# Patient Record
Sex: MALE | Race: WHITE | HISPANIC OR LATINO | Employment: FULL TIME | URBAN - METROPOLITAN AREA
[De-identification: names, ages, dates, MRNs, and addresses within clinical notes are randomized per-mention and may not be internally consistent; named-entity substitution may affect disease eponyms.]

---

## 2017-05-18 ENCOUNTER — OFFICE VISIT (OUTPATIENT)
Dept: URGENT CARE | Facility: PHYSICIAN GROUP | Age: 42
End: 2017-05-18
Payer: COMMERCIAL

## 2017-05-18 VITALS
TEMPERATURE: 99.7 F | DIASTOLIC BLOOD PRESSURE: 78 MMHG | WEIGHT: 221 LBS | HEART RATE: 92 BPM | OXYGEN SATURATION: 96 % | RESPIRATION RATE: 16 BRPM | HEIGHT: 75 IN | SYSTOLIC BLOOD PRESSURE: 122 MMHG | BODY MASS INDEX: 27.48 KG/M2

## 2017-05-18 DIAGNOSIS — J40 BRONCHITIS: ICD-10-CM

## 2017-05-18 PROCEDURE — 99214 OFFICE O/P EST MOD 30 MIN: CPT | Performed by: PHYSICIAN ASSISTANT

## 2017-05-18 RX ORDER — AZITHROMYCIN 250 MG/1
TABLET, FILM COATED ORAL
Qty: 6 TAB | Refills: 0 | Status: SHIPPED | OUTPATIENT
Start: 2017-05-18 | End: 2017-11-22

## 2017-05-18 RX ORDER — CODEINE PHOSPHATE AND GUAIFENESIN 10; 100 MG/5ML; MG/5ML
5 SOLUTION ORAL EVERY 4 HOURS PRN
Qty: 100 ML | Refills: 0 | Status: SHIPPED | OUTPATIENT
Start: 2017-05-18 | End: 2017-11-22

## 2017-05-18 ASSESSMENT — ENCOUNTER SYMPTOMS
SORE THROAT: 1
WHEEZING: 0
CHILLS: 1
COUGH: 1
PALPITATIONS: 0
HEMOPTYSIS: 0
SPUTUM PRODUCTION: 1
FEVER: 1
SHORTNESS OF BREATH: 1

## 2017-05-18 NOTE — PATIENT INSTRUCTIONS

## 2017-05-18 NOTE — MR AVS SNAPSHOT
"Gianluca Jasso   2017 2:25 PM   Office Visit   MRN: [id number]    Department:  [facility]   Dept Phone:  [phone]    Description:  Male : 1975   Provider:  Jai Zazueta PA-C           Reason for Visit     Cough Chest congestion, shortness of breath, nasal drainage X 6 days       Allergies as of 2017     No Known Allergies      You were diagnosed with     Bronchitis   [[id number]]         Vital Signs     Blood Pressure Pulse Temperature Respirations Height Weight    122/78 mmHg 92 37.6 °C (99.7 °F) 16 1.905 m (6' 3\") 100.245 kg (221 lb)    Body Mass Index Oxygen Saturation Smoking Status             27.62 kg/m2 96% Never Assessed         Basic Information     Date Of Birth Sex Race Ethnicity Preferred Language    1975 Male White  Origin (Danish,Georgian,[place] Islander,[place], etc) English      Health Maintenance        Date Due Completion Dates    IMM DTaP/Tdap/Td Vaccine (1 - Tdap) 1993 ---            Current Immunizations     No immunizations on file.      Below and/or attached are the medications your provider expects you to take. Review all of your home medications and newly ordered medications with your provider and/or pharmacist. Follow medication instructions as directed by your provider and/or pharmacist. Please keep your medication list with you and share with your provider. Update the information when medications are discontinued, doses are changed, or new medications (including over-the-counter products) are added; and carry medication information at all times in the event of emergency situations     Allergies:  No Known Allergies          Medications  Valid as of: May 18, 2017 -  3:58 PM    Generic Name Brand Name Tablet Size Instructions for use    Acetaminophen (Tab) TYLENOL 500 MG Take 500-1,000 mg by mouth every 6 hours as needed.        Amoxicillin (Cap) AMOXIL 500 MG Take 1 Cap by mouth 3 times a day.        Azithromycin (Tab) ZITHROMAX 250 MG " Take 500 mg (two tablets) on day one and then take 250 mg (1 tablet) for 4 days.        Guaifenesin-Codeine (Solution) ROBITUSSIN -10 mg/5mL Take 5 mL by mouth every four hours as needed for Cough.        .                 Medicines prescribed today were sent to:     [id number] - [place] - [address] [place] [address]    Phone: [phone] Fax: [phone]    Open 24 Hours?: No      Medication refill instructions:       If your prescription bottle indicates you have medication refills left, it is not necessary to call your provider’s office. Please contact your pharmacy and they will refill your medication.    If your prescription bottle indicates you do not have any refills left, you may request refills at any time through one of the following ways: The online [employer] system (except Urgent Care), by calling your provider’s office, or by asking your pharmacy to contact your provider’s office with a refill request. Medication refills are processed only during regular business hours and may not be available until the next business day. Your provider may request additional information or to have a follow-up visit with you prior to refilling your medication.   *Please Note: Medication refills are assigned a new Rx number when refilled electronically. Your pharmacy may indicate that no refills were authorized even though a new prescription for the same medication is available at the pharmacy. Please request the medicine by name with the pharmacy before contacting your provider for a refill.        Instructions    Bronchitis  Bronchitis is the body's way of reacting to injury and/or infection (inflammation) of the bronchi. Bronchi are the air tubes that extend from the windpipe into the lungs. If the inflammation becomes severe, it may cause shortness of breath.  CAUSES   Inflammation may be caused by:  · A virus.  · Germs (bacteria).  · Dust.  · Allergens.  · Pollutants and many other  irritants.  The cells lining the bronchial tree are covered with tiny hairs (cilia). These constantly beat upward, away from the lungs, toward the mouth. This keeps the lungs free of pollutants. When these cells become too irritated and are unable to do their job, mucus begins to develop. This causes the characteristic cough of bronchitis. The cough clears the lungs when the cilia are unable to do their job. Without either of these protective mechanisms, the mucus would settle in the lungs. Then you would develop pneumonia.  Smoking is a common cause of bronchitis and can contribute to pneumonia. Stopping this habit is the single most important thing you can do to help yourself.  TREATMENT   · Your caregiver may prescribe an antibiotic if the cough is caused by bacteria. Also, medicines that open up your airways make it easier to breathe. Your caregiver may also recommend or prescribe an expectorant. It will loosen the mucus to be coughed up. Only take over-the-counter or prescription medicines for pain, discomfort, or fever as directed by your caregiver.  · Removing whatever causes the problem (smoking, for example) is critical to preventing the problem from getting worse.  · Cough suppressants may be prescribed for relief of cough symptoms.  · Inhaled medicines may be prescribed to help with symptoms now and to help prevent problems from returning.  · For those with recurrent (chronic) bronchitis, there may be a need for steroid medicines.  SEEK IMMEDIATE MEDICAL CARE IF:   · During treatment, you develop more pus-like mucus (purulent sputum).  · You have a fever.  · Your baby is older than 3 months with a rectal temperature of 102° F (38.9° C) or higher.  · Your baby is 3 months old or younger with a rectal temperature of 100.4° F (38° C) or higher.  · You become progressively more ill.  · You have increased difficulty breathing, wheezing, or shortness of breath.  It is necessary to seek immediate medical care if  you are elderly or sick from any other disease.  MAKE SURE YOU:   · Understand these instructions.  · Will watch your condition.  · Will get help right away if you are not doing well or get worse.  Document Released: 12/18/2006 Document Revised: 03/11/2013 Document Reviewed: 10/27/2009  ExitCare® Patient Information ©2014 [employer].            "[employer]" Access Code: [email or web address]  Expires: 6/17/2017  2:46 PM    "[employer]"  A secure, online tool to manage your health information     [employer]’s "[employer]"® is a secure, online tool that connects you to your personalized health information from the privacy of your home -- day or night - making it very easy for you to manage your healthcare. Once the activation process is completed, you can even access your medical information using the "[employer]" rose, which is available for free in the Apple Rose store or Google Play store.     "[employer]" provides the following levels of access (as shown below):   My Chart Features   [facility] Doctor [facility]  Specialists [facility]  Urgent  Care Non-[facility]  Primary Care  Doctor   Email your healthcare team securely and privately 24/7 X X X    Manage appointments: schedule your next appointment; view details of past/upcoming appointments X      Request prescription refills. X      View recent personal medical records, including lab and immunizations X X X X   View health record, including health history, allergies, medications X X X X   Read reports about your outpatient visits, procedures, consult and ER notes X X X X   See your discharge summary, which is a recap of your hospital and/or ER visit that includes your diagnosis, lab results, and care plan. X X       How to register for "[employer]":  1. Go to  [email or web address].  2. Click on the Sign Up Now box, which takes you to the New Member Sign Up page. You will need to provide the following information:  a. Enter your "[employer]" Access Code exactly as it appears at the top of  this page. (You will not need to use this code after you’ve completed the sign-up process. If you do not sign up before the expiration date, you must request a new code.)   b. Enter your date of birth.   c. Enter your home email address.   d. Click Submit, and follow the next screen’s instructions.  3. Create a [employer] ID. This will be your [employer] login ID and cannot be changed, so think of one that is secure and easy to remember.  4. Create a [employer] password. You can change your password at any time.  5. Enter your Password Reset Question and Answer. This can be used at a later time if you forget your password.   6. Enter your e-mail address. This allows you to receive e-mail notifications when new information is available in [employer].  7. Click Sign Up. You can now view your health information.    For assistance activating your [employer] account, call [phone]

## 2017-05-19 NOTE — PROGRESS NOTES
"Subjective:      Gianluca Jasso is a 41 y.o. male who presents with Cough            Cough  This is a new problem. The current episode started in the past 7 days. The problem has been rapidly worsening. The cough is productive of sputum. Associated symptoms include chills, ear pain, a fever, a sore throat and shortness of breath. Pertinent negatives include no chest pain, hemoptysis or wheezing. He has tried OTC cough suppressant for the symptoms. The treatment provided no relief.       Review of Systems   Constitutional: Positive for fever, chills and malaise/fatigue.   HENT: Positive for congestion, ear pain and sore throat.    Respiratory: Positive for cough, sputum production and shortness of breath. Negative for hemoptysis and wheezing.    Cardiovascular: Negative for chest pain and palpitations.     All other systems reviewed and are negative.  PMH:  has no past medical history on file.  MEDS:   Current outpatient prescriptions:   •  guaifenesin-codeine (CHERATUSSIN AC) Solution oral solution, Take 5 mL by mouth every four hours as needed for Cough., Disp: 100 mL, Rfl: 0  •  azithromycin (ZITHROMAX) 250 MG Tab, Take 500 mg (two tablets) on day one and then take 250 mg (1 tablet) for 4 days., Disp: 6 Tab, Rfl: 0  •  acetaminophen (TYLENOL) 500 MG Tab, Take 500-1,000 mg by mouth every 6 hours as needed., Disp: , Rfl:   •  amoxicillin (AMOXIL) 500 MG Cap, Take 1 Cap by mouth 3 times a day., Disp: 30 Cap, Rfl: 0  ALLERGIES: No Known Allergies  SURGHX: History reviewed. No pertinent past surgical history.  SOCHX:    FH: Family history was reviewed, no pertinent findings to report  Medications, Allergies, and current problem list reviewed today in Epic       Objective:     /78 mmHg  Pulse 92  Temp(Src) 37.6 °C (99.7 °F)  Resp 16  Ht 1.905 m (6' 3\")  Wt 100.245 kg (221 lb)  BMI 27.62 kg/m2  SpO2 96%     Physical Exam   Constitutional: He is oriented to person, place, and time. He appears well-developed " and well-nourished.   HENT:   Head: Normocephalic and atraumatic.   Right Ear: Hearing, tympanic membrane, external ear and ear canal normal.   Left Ear: Hearing, tympanic membrane, external ear and ear canal normal.   Nose: Nose normal.   Mouth/Throat: Uvula is midline, oropharynx is clear and moist and mucous membranes are normal.   Neck: Normal range of motion. Neck supple.   Cardiovascular: Normal rate, regular rhythm and normal heart sounds.  Exam reveals no gallop and no friction rub.    No murmur heard.  Pulmonary/Chest: Effort normal and breath sounds normal. No accessory muscle usage. No apnea, no tachypnea and no bradypnea. No respiratory distress. He has no decreased breath sounds. He has no wheezes. He has no rhonchi. He has no rales. He exhibits no tenderness.   Neurological: He is alert and oriented to person, place, and time.   Skin: Skin is warm and dry.   Psychiatric: He has a normal mood and affect. His behavior is normal. Judgment and thought content normal.   Vitals reviewed.              Assessment/Plan:     1. Bronchitis    - guaifenesin-codeine (CHERATUSSIN AC) Solution oral solution; Take 5 mL by mouth every four hours as needed for Cough.  Dispense: 100 mL; Refill: 0  - azithromycin (ZITHROMAX) 250 MG Tab; Take 500 mg (two tablets) on day one and then take 250 mg (1 tablet) for 4 days.  Dispense: 6 Tab; Refill: 0    Differential diagnosis, natural history, supportive care, and indications for immediate follow-up discussed at length.   Follow-up with primary care provider within 4-5 days, emergency room precautions discussed.  Patient and/or family appears understanding of information.

## 2017-11-22 ENCOUNTER — OFFICE VISIT (OUTPATIENT)
Dept: URGENT CARE | Facility: PHYSICIAN GROUP | Age: 42
End: 2017-11-22
Payer: COMMERCIAL

## 2017-11-22 ENCOUNTER — APPOINTMENT (OUTPATIENT)
Dept: RADIOLOGY | Facility: IMAGING CENTER | Age: 42
End: 2017-11-22
Attending: PHYSICIAN ASSISTANT
Payer: COMMERCIAL

## 2017-11-22 VITALS
SYSTOLIC BLOOD PRESSURE: 130 MMHG | BODY MASS INDEX: 26.73 KG/M2 | WEIGHT: 215 LBS | TEMPERATURE: 98.9 F | DIASTOLIC BLOOD PRESSURE: 78 MMHG | OXYGEN SATURATION: 95 % | HEART RATE: 88 BPM | HEIGHT: 75 IN | RESPIRATION RATE: 16 BRPM

## 2017-11-22 DIAGNOSIS — M54.32 LEFT SIDED SCIATICA: ICD-10-CM

## 2017-11-22 PROCEDURE — 99214 OFFICE O/P EST MOD 30 MIN: CPT | Performed by: PHYSICIAN ASSISTANT

## 2017-11-22 PROCEDURE — 72100 X-RAY EXAM L-S SPINE 2/3 VWS: CPT | Mod: TC | Performed by: PHYSICIAN ASSISTANT

## 2017-11-22 RX ORDER — KETOROLAC TROMETHAMINE 30 MG/ML
60 INJECTION, SOLUTION INTRAMUSCULAR; INTRAVENOUS ONCE
Status: COMPLETED | OUTPATIENT
Start: 2017-11-22 | End: 2017-11-22

## 2017-11-22 RX ORDER — MELOXICAM 15 MG/1
15 TABLET ORAL DAILY
Qty: 10 TAB | Refills: 0 | Status: SHIPPED | OUTPATIENT
Start: 2017-11-22 | End: 2017-12-02

## 2017-11-22 RX ADMIN — KETOROLAC TROMETHAMINE 60 MG: 30 INJECTION, SOLUTION INTRAMUSCULAR; INTRAVENOUS at 18:34

## 2017-11-22 ASSESSMENT — ENCOUNTER SYMPTOMS
LEG PAIN: 1
CONSTITUTIONAL NEGATIVE: 1
NEUROLOGICAL NEGATIVE: 1
BRUISES/BLEEDS EASILY: 0

## 2017-11-23 NOTE — PROGRESS NOTES
"Subjective:      Gianluca Jasso is a 42 y.o. male who presents with Leg Pain (left hip and leg pain - shooting pain from buttock down to bottom of foot  X 2 days )        Leg Pain     Patient presents today for 2 days of left sided buttock pain radiating down to the bottom of his foot. He states he feels the pain most intensely in the buttock and thigh but sometimes it will radiate down to the heel.  He states there is occasional tingling but no numbness.   He denies weakness in the leg.  Having hard time sleeping or turning in bed but worst pain is with standing up and taking first few steps.  He denies any injuries or traumas.  He does not have chronic back issues.  Had something similar to a more mild degree happen in the past but never sought treatment as it went away on its own.  States he took a few OTC medications but not helping.     Review of Systems   Constitutional: Negative.    Musculoskeletal:        SEE HPI   Skin: Negative.    Neurological: Negative.    Endo/Heme/Allergies: Does not bruise/bleed easily.   All other systems reviewed and are negative.       PMH:  has no past medical history on file.  MEDS:   Current Outpatient Prescriptions:   •  meloxicam (MOBIC) 15 MG tablet, Take 1 Tab by mouth every day for 10 days., Disp: 10 Tab, Rfl: 0  •  acetaminophen (TYLENOL) 500 MG Tab, Take 500-1,000 mg by mouth every 6 hours as needed., Disp: , Rfl:   ALLERGIES: No Known Allergies  SURGHX: History reviewed. No pertinent surgical history.  SOCHX:  reports that he has been smoking.  He has never used smokeless tobacco.  FH: Family history was reviewed, no pertinent findings to report     Objective:     /78   Pulse 88   Temp 37.2 °C (98.9 °F)   Resp 16   Ht 1.905 m (6' 3\")   Wt 97.5 kg (215 lb)   SpO2 95%   BMI 26.87 kg/m²      Physical Exam   Constitutional: He is oriented to person, place, and time. He appears well-developed and well-nourished. No distress.   Cardiovascular: Normal rate and " regular rhythm.    Pulmonary/Chest: Effort normal and breath sounds normal.   Musculoskeletal:        Legs:  Neurological: He is alert and oriented to person, place, and time.   Skin: Skin is warm and dry.   Psychiatric: He has a normal mood and affect. His behavior is normal.   Vitals reviewed.          Assessment/Plan:     1. Left sided sciatica  DX-LUMBAR SPINE-2 OR 3 VIEWS    ketorolac (TORADOL) injection 60 mg    meloxicam (MOBIC) 15 MG tablet         -RAD without acute abnormalities.   -Toradol shot given in clinic, patient tolerated well.  Monitored for 10 mins s/p shot.   -dx of sciatica, education provided verbally and in writing.   -back care discussed, proper lifting mechanics discussed  -recommend heat/ice prn.  Gentle stretches daily.   -recommend PCP follow up, anti-inflammatory trial as above. Start tomorrow.  Stay well hydrated.   -back pain red flags and ER precautions discussed with patient.       Supportive care, differential diagnoses, and indications for immediate follow-up discussed with patient.   Pathogenesis of diagnosis discussed including typical length and natural progression.   Instructed to return to clinic or nearest emergency department for any change in condition, further concerns, or worsening of symptoms.  Patient states understanding of the plan of care and discharge instructions.  Instructed to make an appointment, for follow up, with their primary care provider.      Mitzi Pablo P.A.-C.

## 2017-11-30 ENCOUNTER — HOSPITAL ENCOUNTER (OUTPATIENT)
Dept: LAB | Facility: MEDICAL CENTER | Age: 42
End: 2017-11-30
Attending: NURSE PRACTITIONER
Payer: COMMERCIAL

## 2017-11-30 ENCOUNTER — OFFICE VISIT (OUTPATIENT)
Dept: MEDICAL GROUP | Facility: PHYSICIAN GROUP | Age: 42
End: 2017-11-30
Payer: COMMERCIAL

## 2017-11-30 ENCOUNTER — TELEPHONE (OUTPATIENT)
Dept: MEDICAL GROUP | Facility: PHYSICIAN GROUP | Age: 42
End: 2017-11-30

## 2017-11-30 VITALS
DIASTOLIC BLOOD PRESSURE: 80 MMHG | TEMPERATURE: 97.6 F | RESPIRATION RATE: 16 BRPM | OXYGEN SATURATION: 97 % | BODY MASS INDEX: 26.49 KG/M2 | SYSTOLIC BLOOD PRESSURE: 116 MMHG | HEART RATE: 65 BPM | HEIGHT: 75 IN | WEIGHT: 213 LBS

## 2017-11-30 DIAGNOSIS — J39.2 LESION OF THROAT: ICD-10-CM

## 2017-11-30 DIAGNOSIS — Z00.00 WELLNESS EXAMINATION: ICD-10-CM

## 2017-11-30 DIAGNOSIS — M54.32 LEFT SIDED SCIATICA: ICD-10-CM

## 2017-11-30 LAB
ALBUMIN SERPL BCP-MCNC: 4.2 G/DL (ref 3.2–4.9)
ALBUMIN/GLOB SERPL: 1.5 G/DL
ALP SERPL-CCNC: 87 U/L (ref 30–99)
ALT SERPL-CCNC: 25 U/L (ref 2–50)
ANION GAP SERPL CALC-SCNC: 10 MMOL/L (ref 0–11.9)
AST SERPL-CCNC: 14 U/L (ref 12–45)
BASOPHILS # BLD AUTO: 0.7 % (ref 0–1.8)
BASOPHILS # BLD: 0.07 K/UL (ref 0–0.12)
BILIRUB SERPL-MCNC: 0.5 MG/DL (ref 0.1–1.5)
BUN SERPL-MCNC: 18 MG/DL (ref 8–22)
CALCIUM SERPL-MCNC: 9.9 MG/DL (ref 8.5–10.5)
CHLORIDE SERPL-SCNC: 103 MMOL/L (ref 96–112)
CHOLEST SERPL-MCNC: 221 MG/DL (ref 100–199)
CO2 SERPL-SCNC: 25 MMOL/L (ref 20–33)
CREAT SERPL-MCNC: 0.8 MG/DL (ref 0.5–1.4)
EOSINOPHIL # BLD AUTO: 0.25 K/UL (ref 0–0.51)
EOSINOPHIL NFR BLD: 2.5 % (ref 0–6.9)
ERYTHROCYTE [DISTWIDTH] IN BLOOD BY AUTOMATED COUNT: 40.9 FL (ref 35.9–50)
GFR SERPL CREATININE-BSD FRML MDRD: >60 ML/MIN/1.73 M 2
GLOBULIN SER CALC-MCNC: 2.8 G/DL (ref 1.9–3.5)
GLUCOSE SERPL-MCNC: 229 MG/DL (ref 65–99)
HCT VFR BLD AUTO: 46.1 % (ref 42–52)
HDLC SERPL-MCNC: 38 MG/DL
HGB BLD-MCNC: 15.2 G/DL (ref 14–18)
IMM GRANULOCYTES # BLD AUTO: 0.06 K/UL (ref 0–0.11)
IMM GRANULOCYTES NFR BLD AUTO: 0.6 % (ref 0–0.9)
LDLC SERPL CALC-MCNC: 115 MG/DL
LYMPHOCYTES # BLD AUTO: 3.96 K/UL (ref 1–4.8)
LYMPHOCYTES NFR BLD: 39.1 % (ref 22–41)
MCH RBC QN AUTO: 29.6 PG (ref 27–33)
MCHC RBC AUTO-ENTMCNC: 33 G/DL (ref 33.7–35.3)
MCV RBC AUTO: 89.7 FL (ref 81.4–97.8)
MONOCYTES # BLD AUTO: 0.69 K/UL (ref 0–0.85)
MONOCYTES NFR BLD AUTO: 6.8 % (ref 0–13.4)
NEUTROPHILS # BLD AUTO: 5.09 K/UL (ref 1.82–7.42)
NEUTROPHILS NFR BLD: 50.3 % (ref 44–72)
NRBC # BLD AUTO: 0 K/UL
NRBC BLD AUTO-RTO: 0 /100 WBC
PLATELET # BLD AUTO: 326 K/UL (ref 164–446)
PMV BLD AUTO: 11.7 FL (ref 9–12.9)
POTASSIUM SERPL-SCNC: 4.7 MMOL/L (ref 3.6–5.5)
PROT SERPL-MCNC: 7 G/DL (ref 6–8.2)
RBC # BLD AUTO: 5.14 M/UL (ref 4.7–6.1)
SODIUM SERPL-SCNC: 138 MMOL/L (ref 135–145)
TRIGL SERPL-MCNC: 339 MG/DL (ref 0–149)
WBC # BLD AUTO: 10.1 K/UL (ref 4.8–10.8)

## 2017-11-30 PROCEDURE — 80053 COMPREHEN METABOLIC PANEL: CPT

## 2017-11-30 PROCEDURE — 36415 COLL VENOUS BLD VENIPUNCTURE: CPT

## 2017-11-30 PROCEDURE — 85025 COMPLETE CBC W/AUTO DIFF WBC: CPT

## 2017-11-30 PROCEDURE — 80061 LIPID PANEL: CPT

## 2017-11-30 PROCEDURE — 99214 OFFICE O/P EST MOD 30 MIN: CPT | Performed by: NURSE PRACTITIONER

## 2017-11-30 RX ORDER — TIZANIDINE HYDROCHLORIDE 4 MG/1
4 CAPSULE, GELATIN COATED ORAL 3 TIMES DAILY
Qty: 90 CAP | Refills: 0 | Status: SHIPPED | OUTPATIENT
Start: 2017-11-30 | End: 2018-01-22

## 2017-11-30 ASSESSMENT — PATIENT HEALTH QUESTIONNAIRE - PHQ9: CLINICAL INTERPRETATION OF PHQ2 SCORE: 0

## 2017-11-30 ASSESSMENT — PAIN SCALES - GENERAL: PAINLEVEL: NO PAIN

## 2017-11-30 NOTE — ASSESSMENT & PLAN NOTE
"This is a new problem. Patient states that his dentist noticed \"something in his throat\" and told him to have it checked out by a primary care provider. Patient states that he does not have any pain, sore throat, itching or irritation. Patient is a smoker but has been working on quitting. Patient states he has not had any discharge, bumps or irritation.  "

## 2017-11-30 NOTE — PROGRESS NOTES
"Chief Complaint   Patient presents with   • Establish Care     New Patient    • Back Pain     L side x 1 week goes down leg    • Pharyngitis     x 3 weeks        HISTORY OF THE PRESENT ILLNESS: This is a 42 y.o. male new patient to our practice. This pleasant patient is here today to discuss sciatica.    Left sided sciatica  This is a new problem started last Monday, went to the gym. States when he woke up and got out of bed he felt a sudden \"pull like a spring and a tingling sensation\" by Wednesday it was so severe he was having difficulty walking. States he is having some numbness in his left leg. Meloxicam improves the pain, but does affect the numbess. Stretching and exercising improves the numbness states that it is resolving. No issues with bowel or bladder function. 3/10 at the worst currently.     Lesion of throat  This is a new problem. Patient states that his dentist noticed \"something in his throat\" and told him to have it checked out by a primary care provider. Patient states that he does not have any pain, sore throat, itching or irritation. Patient is a smoker but has been working on quitting. Patient states he has not had any discharge, bumps or irritation.      History reviewed. No pertinent past medical history.    History reviewed. No pertinent surgical history.    Family Status   Relation Status   • Mother Alive   • Father Alive     Family History   Problem Relation Age of Onset   • Kidney Disease Mother    • Diabetes Mother    • Hypertension Mother    • Diabetes Father        Social History   Substance Use Topics   • Smoking status: Light Tobacco Smoker     Types: Cigarettes   • Smokeless tobacco: Never Used      Comment: Patient is smoking 1-2x/week   • Alcohol use Yes       Allergies: Patient has no known allergies.    Current Outpatient Prescriptions Ordered in [facility]   Medication Sig Dispense Refill   • tizanidine (ZANAFLEX) 4 MG capsule Take 1 Cap by mouth 3 times a day. 90 Cap 0   • meloxicam " "(MOBIC) 15 MG tablet Take 1 Tab by mouth every day for 10 days. 10 Tab 0     No current Epic-ordered facility-administered medications on file.        Review of Systems   Constitutional:  Negative for fever, chills, weight loss and malaise/fatigue.   HENT:  Negative for ear pain, nosebleeds, congestion, sore throat and neck pain.    Eyes:  Negative for blurred vision.   Respiratory:  Negative for cough, sputum production, shortness of breath and wheezing.    Cardiovascular:  Negative for chest pain, palpitations, orthopnea and leg swelling.   Gastrointestinal:  Negative for heartburn, nausea, vomiting and abdominal pain.   Genitourinary:  Negative for dysuria, urgency and frequency.   Musculoskeletal: Positive for myalgias, back pain and joint pain.   Skin: Negative for rash and itching.   Neurological:  Negative for dizziness, tingling, tremors, sensory change, focal weakness and headaches.   Endo/Heme/Allergies:  Does not bruise/bleed easily.   Psychiatric/Behavioral:  Negative for depression, anxiety, or memory loss.     All other systems reviewed and are negative except as in HPI.    Exam: Blood pressure 116/80, pulse 65, temperature 36.4 °C (97.6 °F), resp. rate 16, height 1.905 m (6' 3\"), weight 96.6 kg (213 lb), SpO2 97 %.  General:  Normal appearing. No distress.  HEENT:  Normocephalic. Eyes conjunctiva clear lids without ptosis, pupils equal and reactive to light accommodation, ears normal shape and contour, canals are clear bilaterally, tympanic membranes are benign, nasal mucosa benign, oropharynx is with round protruding pink flap of skin on the right superior oral cavity, there is also a white patch on the left side that does not clear with gentle wiping.   Neck:  Supple without JVD or bruit. Thyroid is not enlarged.  Pulmonary:  Clear to ausculation.  Normal effort. No rales, ronchi, or wheezing.  Cardiovascular:  Regular rate and rhythm without murmur. Carotid and radial pulses are intact and equal " bilaterally.  Abdomen:  Soft, nontender, nondistended. Normal bowel sounds. Liver and spleen are not palpable  Neurologic:  Grossly nonfocal  Lymph:  No cervical, supraclavicular or axillary lymph nodes are palpable  Skin:  Warm and dry.  No obvious lesions.  Musculoskeletal:  Normal gait. No extremity cyanosis, clubbing, or edema. SPINE: Moderate tenderness in paraspinous muscles lumbar spine with current spasm. SLT negative. DTR 2+ patella, 1+ achilles bilaterally. Strength 5/5 proximal and distal LE.  No pain with stress of SI. Full hip ROM. Poor hamstring flexibility. No symptoms with axial loading.   Psych:  Normal mood and affect. Alert and oriented x3. Judgment and insight is normal.    PLAN:    1. Left sided sciatica  Counseled patient regarding continuation of conservative therapy including exercise, meloxicam, ice, rest.   He is referred to physical therapy for assistance with exercises to improve sciatica.  Patient states that numbness is improving, as such we will hold off on imaging at this time.  - REFERRAL TO PHYSICAL THERAPY Reason for Therapy: Eval/Treat/Report  - tizanidine (ZANAFLEX) 4 MG capsule; Take 1 Cap by mouth 3 times a day.  Dispense: 90 Cap; Refill: 0    2. Wellness examination  - COMP METABOLIC PANEL; Future  - LIPID PROFILE; Future  - CBC WITH DIFFERENTIAL; Future    3. Lesion of throat  - REFERRAL TO ENT    Follow-up as needed, a son lab results or in one year for physical examination. Patient is encouraged to be seen in the emergency room for chest pain, palpitations, shortness of breath, dizziness, severe abdominal pain or other concerning symptoms.    Please note that this dictation was created using voice recognition software. I have made every reasonable attempt to correct obvious errors, but I expect that there are errors of grammar and possibly content that I did not discover before finalizing the note.      Assessment/Plan  1. Left sided sciatica  REFERRAL TO PHYSICAL THERAPY  Reason for Therapy: Eval/Treat/Report    tizanidine (ZANAFLEX) 4 MG capsule   2. Wellness examination  COMP METABOLIC PANEL    LIPID PROFILE    CBC WITH DIFFERENTIAL   3. Lesion of throat  REFERRAL TO ENT         I have placed the below orders and discussed them with an approved delegating provider. The MA is performing the below orders under the direction of Dr. Richardson.

## 2017-11-30 NOTE — ASSESSMENT & PLAN NOTE
"This is a new problem started last Monday, went to the gym. States when he woke up and got out of bed he felt a sudden \"pull like a spring and a tingling sensation\" by Wednesday it was so severe he was having difficulty walking. States he is having some numbness in his left leg. Meloxicam improves the pain, but does affect the numbess. Stretching and exercising improves the numbness states that it is resolving. No issues with bowel or bladder function. 3/10 at the worst currently.   "

## 2017-12-01 NOTE — TELEPHONE ENCOUNTER
----- Message from CLAUDE Enriquez sent at 11/30/2017  5:14 PM PST -----  Lab Review: Please have patient schedule close follow-up to discuss. With patient noted that his fasting glucose was significantly elevated at 229 and then I would like to see him to discuss this result.

## 2017-12-01 NOTE — TELEPHONE ENCOUNTER
Phone Number Called: [phone] (home)     Message: Pt notified of results below.     Left Message for patient to call back: no

## 2017-12-05 ENCOUNTER — OFFICE VISIT (OUTPATIENT)
Dept: MEDICAL GROUP | Facility: PHYSICIAN GROUP | Age: 42
End: 2017-12-05
Payer: COMMERCIAL

## 2017-12-05 VITALS
RESPIRATION RATE: 16 BRPM | DIASTOLIC BLOOD PRESSURE: 80 MMHG | TEMPERATURE: 97.5 F | OXYGEN SATURATION: 97 % | BODY MASS INDEX: 27.1 KG/M2 | SYSTOLIC BLOOD PRESSURE: 124 MMHG | WEIGHT: 218 LBS | HEIGHT: 75 IN | HEART RATE: 62 BPM

## 2017-12-05 DIAGNOSIS — R73.01 ELEVATED FASTING GLUCOSE: ICD-10-CM

## 2017-12-05 PROBLEM — E11.9 TYPE 2 DIABETES MELLITUS WITHOUT COMPLICATION, WITHOUT LONG-TERM CURRENT USE OF INSULIN (HCC): Status: ACTIVE | Noted: 2017-12-05

## 2017-12-05 LAB
HBA1C MFR BLD: 9.5 % (ref ?–5.8)
INT CON NEG: NEGATIVE
INT CON POS: POSITIVE

## 2017-12-05 PROCEDURE — 99214 OFFICE O/P EST MOD 30 MIN: CPT | Performed by: NURSE PRACTITIONER

## 2017-12-05 PROCEDURE — 83036 HEMOGLOBIN GLYCOSYLATED A1C: CPT | Performed by: NURSE PRACTITIONER

## 2017-12-05 ASSESSMENT — PAIN SCALES - GENERAL: PAINLEVEL: NO PAIN

## 2017-12-05 NOTE — ASSESSMENT & PLAN NOTE
"States he has family history. Cut carbs and sugar out of his diet 1 year ago. States he does some rice and beans. No soda. He does exercise. Denies symptoms including thirst, frequent urination. States he has started exercising. 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 Patient does have significant history of mother with diabetes. States that mother does not care for her diabetes appropriately. He states that he would like to care for his diabetes \"naturally\". Patient does not want to be on chronic medication. Patient is given metformin 500 mg twice daily at this time and encouraged up regarding the importance of controlling diabetes. Patient's A1c is 9.5 at this time. As with patient that this does put him in the category of diabetes.  "

## 2017-12-05 NOTE — PROGRESS NOTES
"Chief Complaint   Patient presents with   • Follow-Up     labs        HISTORY OF PRESENT ILLNESS: Patient is a 42 y.o. male established patient who presents today to discuss elevated blood sugar.    Type 2 diabetes mellitus without complication, without long-term current use of insulin (CMS-HCC)  States he has family history. Cut carbs and sugar out of his diet 1 year ago. States he does some rice and beans. No soda. He does exercise. Denies symptoms including thirst, frequent urination. States he has started exercising. 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 Patient does have significant history of mother with diabetes. States that mother does not care for her diabetes appropriately. He states that he would like to care for his diabetes \"naturally\". Patient does not want to be on chronic medication. Patient is given metformin 500 mg twice daily at this time and encouraged up regarding the importance of controlling diabetes. Patient's A1c is 9.5 at this time. As with patient that this does put him in the category of diabetes.      Patient Active Problem List    Diagnosis Date Noted   • Type 2 diabetes mellitus without complication, without long-term current use of insulin (CMS-HCC) 12/05/2017   • Left sided sciatica 11/30/2017   • Lesion of throat 11/30/2017       Allergies:Patient has no known allergies.    Current Outpatient Prescriptions   Medication Sig Dispense Refill   • metformin (GLUCOPHAGE) 500 MG Tab Take 1 Tab by mouth 2 times a day, with meals. 60 Tab 0   • Blood Glucose Monitoring Suppl Device Meter: Dispense Device of Insurance Preference. Sig. Use as directed for blood sugar monitoring. #1. NR. 1 Device 0   • Blood Glucose Monitoring Suppl Supplies Misc Test strips order: Test strips for covered by insurance meter. Sig: use once daily and prn ssx high or low sugar #100 RF x 3 100 Strip 3   • tizanidine (ZANAFLEX) 4 MG capsule Take 1 Cap by mouth 3 times a day. 90 Cap 0     No current " "facility-administered medications for this visit.        Social History   Substance Use Topics   • Smoking status: Light Tobacco Smoker     Types: Cigarettes   • Smokeless tobacco: Never Used      Comment: Patient is smoking 1-2x/week   • Alcohol use Yes       Family Status   Relation Status   • Mother Alive   • Father Alive     Family History   Problem Relation Age of Onset   • Kidney Disease Mother    • Diabetes Mother    • Hypertension Mother    • Diabetes Father        Review of Systems:   Constitutional:  Negative for fever, chills, weight loss and malaise/fatigue.   HEENT:  Negative for ear pain, nosebleeds, congestion, sore throat and neck pain.    Eyes:  Negative for blurred vision.   Respiratory:  Negative for cough, sputum production, shortness of breath and wheezing.    Cardiovascular:  Negative for chest pain, palpitations, orthopnea and leg swelling.   Gastrointestinal:  Negative for heartburn, nausea, vomiting and abdominal pain.   Genitourinary:  Negative for dysuria, urgency and frequency.   Musculoskeletal:  Negative for myalgias, back pain and joint pain.   Skin:  Negative for rash and itching.   Neurological:  Negative for dizziness, tingling, tremors, sensory change, focal weakness and headaches.   Endo/Heme/Allergies:  Does not bruise/bleed easily.   Psychiatric/Behavioral:  Negative for depression, suicidal ideas and memory loss.  The patient is not nervous/anxious and does not have insomnia.    All other systems reviewed and are negative except as in HPI.    Exam:  Blood pressure 124/80, pulse 62, temperature 36.4 °C (97.5 °F), resp. rate 16, height 1.905 m (6' 3\"), weight 98.9 kg (218 lb), SpO2 97 %.  General:  Normal appearing. No distress.  HEENT:  Normocephalic. Eyes conjunctiva clear lids without ptosis, pupils equal and reactive to light accommodation, ears normal shape and contour, canals are clear bilaterally, tympanic membranes are benign, nasal mucosa benign, oropharynx is without " erythema, edema or exudates.   Neck:  Supple without JVD or bruit. Thyroid is not enlarged.  Pulmonary:  Clear to ausculation.  Normal effort. No rales, ronchi, or wheezing.  Cardiovascular:  Regular rate and rhythm without murmur. Carotid and radial pulses are intact and equal bilaterally.  Abdomen:  Soft, nontender, nondistended. Normal bowel sounds. Liver and spleen are not palpable  Neurologic:  Grossly nonfocal  Lymph:  No cervical, supraclavicular or axillary lymph nodes are palpable  Skin:  Warm and dry.  No obvious lesions.  Musculoskeletal:  Normal gait. No extremity cyanosis, clubbing, or edema.  Psych:  Normal mood and affect. Alert and oriented x3. Judgment and insight is normal.      PLAN:    1. Uncontrolled type 2 diabetes mellitus without complication, without long-term current use of insulin (CMS-HCC)  POCT A1c is 9.5.  Counseled patient regarding diet and exercise, discussed carbs, discussed monitoring glucose at couple of times a week discussed importance of taking medication to control blood sugar to prevent secondary complications. Patient refuses to start statin, low-dose lisinopril at this time. Plan to follow up with patient in one month.  - metformin (GLUCOPHAGE) 500 MG Tab; Take 1 Tab by mouth 2 times a day, with meals.  Dispense: 60 Tab; Refill: 0  - Blood Glucose Monitoring Suppl Device; Meter: Dispense Device of Insurance Preference. Sig. Use as directed for blood sugar monitoring. #1. NR.  Dispense: 1 Device; Refill: 0  - Blood Glucose Monitoring Suppl Supplies Misc; Test strips order: Test strips for covered by insurance meter. Sig: use once daily and prn ssx high or low sugar #100 RF x 3  Dispense: 100 Strip; Refill: 3  - REFERRAL TO DIABETIC EDUCATION Diabetes Self Management Education / Training (DSME/T) and Medical Nutrition Therapy (MNT): Initial Group DSME/MNT as authorized by payor; DSME/T Content: Monitoring Diabetes, Diabetes as disease process, Psychologic...    Follow-up in  one month or sooner as needed. Patient is encouraged to be seen in the emergency room for chest pain, palpitations, shortness of breath, dizziness, severe abdominal pain or other concerning symptoms.    Please note that this dictation was created using voice recognition software. I have made every reasonable attempt to correct obvious errors, but I expect that there are errors of grammar and possibly content that I did not discover before finalizing the note.    Assessment/Plan:  1. Elevated fasting glucose  POCT  A1C   2. Uncontrolled type 2 diabetes mellitus without complication, without long-term current use of insulin (CMS-[facility])  metformin (GLUCOPHAGE) 500 MG Tab    Blood Glucose Monitoring Suppl Device    Blood Glucose Monitoring Suppl Supplies Misc    REFERRAL TO DIABETIC EDUCATION Diabetes Self Management Education / Training (DSME/T) and Medical Nutrition Therapy (MNT): Initial Group DSME/MNT as authorized by payor; DSME/T Content: Monitoring Diabetes, Diabetes as disease process, Psychologic...          I have placed the below orders and discussed them with an approved delegating provider. The MA is performing the below orders under the direction of Dr. Richardson.

## 2017-12-08 ENCOUNTER — APPOINTMENT (OUTPATIENT)
Dept: PHYSICAL THERAPY | Facility: REHABILITATION | Age: 42
End: 2017-12-08
Attending: NURSE PRACTITIONER
Payer: COMMERCIAL

## 2018-01-22 ENCOUNTER — HOSPITAL ENCOUNTER (OUTPATIENT)
Dept: LAB | Facility: MEDICAL CENTER | Age: 43
End: 2018-01-22
Attending: NURSE PRACTITIONER
Payer: COMMERCIAL

## 2018-01-22 ENCOUNTER — OFFICE VISIT (OUTPATIENT)
Dept: MEDICAL GROUP | Facility: PHYSICIAN GROUP | Age: 43
End: 2018-01-22
Payer: COMMERCIAL

## 2018-01-22 VITALS
DIASTOLIC BLOOD PRESSURE: 80 MMHG | BODY MASS INDEX: 26.95 KG/M2 | TEMPERATURE: 97.7 F | WEIGHT: 215.61 LBS | HEART RATE: 79 BPM | OXYGEN SATURATION: 97 % | SYSTOLIC BLOOD PRESSURE: 123 MMHG

## 2018-01-22 DIAGNOSIS — J02.0 STREP THROAT: ICD-10-CM

## 2018-01-22 DIAGNOSIS — E11.9 TYPE 2 DIABETES MELLITUS WITHOUT COMPLICATION, WITHOUT LONG-TERM CURRENT USE OF INSULIN (HCC): ICD-10-CM

## 2018-01-22 PROBLEM — J02.9 SORE THROAT: Status: ACTIVE | Noted: 2018-01-22

## 2018-01-22 LAB
CREAT UR-MCNC: 114.1 MG/DL
MICROALBUMIN UR-MCNC: <0.7 MG/DL
MICROALBUMIN/CREAT UR: NORMAL MG/G (ref 0–30)

## 2018-01-22 PROCEDURE — 99215 OFFICE O/P EST HI 40 MIN: CPT | Performed by: NURSE PRACTITIONER

## 2018-01-22 PROCEDURE — 82043 UR ALBUMIN QUANTITATIVE: CPT

## 2018-01-22 PROCEDURE — 82570 ASSAY OF URINE CREATININE: CPT

## 2018-01-22 RX ORDER — AMOXICILLIN AND CLAVULANATE POTASSIUM 875; 125 MG/1; MG/1
1 TABLET, FILM COATED ORAL 2 TIMES DAILY
Qty: 20 TAB | Refills: 0 | Status: SHIPPED | OUTPATIENT
Start: 2018-01-22 | End: 2018-02-01

## 2018-01-22 NOTE — PROGRESS NOTES
RN-ERICKE Note    Subjective:     Health changes since last visit/interval Hx: new diagnosis of diabetes December 2017, started metformin 500 mg twice daily.  He also stared natural pills, Moringa and Artichoke.  He believes these have lowered FSBG more than metformin    Medications (including changes made today)  Current Outpatient Prescriptions   Medication Sig Dispense Refill   • metformin (GLUCOPHAGE) 500 MG Tab TAKE 1 TAB BY MOUTH 2 TIMES A DAY, WITH MEALS. 180 Tab 0   • Blood Glucose Monitoring Suppl Device Meter: Dispense Device of Insurance Preference. Sig. Use as directed for blood sugar monitoring. #1. NR. 1 Device 0   • Blood Glucose Monitoring Suppl Supplies Misc Test strips order: Test strips for covered by insurance meter. Sig: use once daily and prn ssx high or low sugar #100 RF x 3 100 Strip 3     No current facility-administered medications for this visit.        Taking daily ASA: No  Taking above medications as prescribed: Yes  Patient Denies side effects of medication.    Exercise: sporadic irregular exercise, <half hour walking weekly  Diet: meals per day on average: 3 balanced except fruit shake at breakfast    Health Maintenance:   Health Maintenance Topics with due status: Overdue       Topic Date Due    IMM HEP B VACCINE 1975    DIABETES MONOFILAMENT / LE EXAM 03/20/1975    RETINAL SCREENING 09/20/1992    URINE ACR / MICROALBUMIN 09/20/1992         DM:   Last A1c:   Lab Results   Component Value Date/Time    HBA1C 9.5 12/05/2017 11:02 AM      A1c goal: < 7    Glucose monitoring frequency: daily  fasting range: 115-180    Hypoglycemic episodes: no     Last Retinal Exam: June 2017 Provider: Lizette  Daily Foot Exam: no  Routine Dental Exams: yes    No results found for: MICROALBCALC, MALBCRT, MALBEXCR, WGXOKE51, MICROALBUR, MICRALB, UMICROALBUM, MICROALBTIM     ACR Albumin/Creatinine Ratio goal <30 due    Diabetic complications: none    HTN:   Blood pressure goal <140/<80 yes.   Currently Rx  ACE/ARB: No    Dyslipidemia:    Lab Results   Component Value Date/Time    CHOLSTRLTOT 221 (H) 11/30/2017 07:58 AM     (H) 11/30/2017 07:58 AM    HDL 38 (A) 11/30/2017 07:58 AM    TRIGLYCERIDE 339 (H) 11/30/2017 07:58 AM       Lab Results   Component Value Date/Time    SODIUM 138 11/30/2017 07:58 AM    POTASSIUM 4.7 11/30/2017 07:58 AM    CHLORIDE 103 11/30/2017 07:58 AM    CO2 25 11/30/2017 07:58 AM    GLUCOSE 229 (H) 11/30/2017 07:58 AM    BUN 18 11/30/2017 07:58 AM    CREATININE 0.80 11/30/2017 07:58 AM     Lab Results   Component Value Date/Time    ALKPHOSPHAT 87 11/30/2017 07:58 AM    ASTSGOT 14 11/30/2017 07:58 AM    ALTSGPT 25 11/30/2017 07:58 AM    TBILIRUBIN 0.5 11/30/2017 07:58 AM        Currently Rx Statin: No      He  reports that he has been smoking Cigarettes.  He has never used smokeless tobacco.    Objective:     Exam:  Monofilament: done  Monofilament testing with a 10 gram force: sensation intact: intact bilaterally  Visual Inspection: Feet without maceration, ulcers, fissures.  Pedal pulses: intact bilaterally      Plan:     Discussed All medications, side effects and compliance (discussed carefully)  Annual eye examinations at Ophthalmology  Diabetic diet discussed in detail, written exchange diet given  Foot care discussed and Podiatry visits  Home glucose monitoring emphasized. Alternating times daily  Patient educated on Interpretation of lab results, Blood Sugar Goals, Exercise, Nutrition  Plate method  Recommended medication changes: none, patient wants to manage diabetes naturally and his FSBG show good control

## 2018-01-22 NOTE — PROGRESS NOTES
Subjective:     HPI    Chief Complaint   Patient presents with   • Diabetes       Gianluca Jasso is a 42 y.o. male who presents for follow up of chronic conditions of diabetes mellitus, hypertension, hyperlipidemia.    Type 2 diabetes mellitus without complication, without long-term current use of insulin (CMS-HCC)  Currently on metformin, artichoke. Reports compliance with medications. Checks BG onc times a day. Fasting BG in the morning is typically in 120's-130's range. reports can feel the low BG symptoms well. Denies numbness or tingling in feet. Last eye exam was June. Is following consistent carb diet or counting carbs. reports regular exercise routine, walking and bicycle. Weight is stable over the past year.       Strep throat  Started Friday. States it is sharp pain. States he is coughing up yellow mucous. Patient states that he is having a cough, headache states he has not really had any fevers. Patient states that he has not tried any make it better or worse.        RN-CDE notes reviewed including HPI for DM, HTN, Hyperlipidemia.  Exercise frequency and limitations reviewed.  Feet symptoms reviewed.  Nutritional status reviewed.  Retinal eye exam history reviewed.      He indicates that he is feeling well and denies any symptoms referable to the above diagnoses. Specifically denies chest pain, palpitations, dyspnea, orthopnea, PND or peripheral edema. Also denies polyuria, polydipsia, urinary complaints, abdominal complaints, myalgias, numbness, weakness or other related symptoms.     Patient Active Problem List    Diagnosis Date Noted   • Strep throat 01/22/2018   • Type 2 diabetes mellitus without complication, without long-term current use of insulin (CMS-HCC) 12/05/2017   • Left sided sciatica 11/30/2017   • Lesion of throat 11/30/2017       Health Maintenance/Immunizations:   Health Maintenance Topics with due status: Overdue       Topic Date Due    IMM HEP B VACCINE 1975    RETINAL  SCREENING 09/20/1992    URINE ACR / MICROALBUMIN 09/20/1992         Current medications including changes today:  Current Outpatient Prescriptions   Medication Sig Dispense Refill   • amoxicillin-clavulanate (AUGMENTIN) 875-125 MG Tab Take 1 Tab by mouth 2 times a day for 10 days. 20 Tab 0   • metformin (GLUCOPHAGE) 500 MG Tab TAKE 1 TAB BY MOUTH 2 TIMES A DAY, WITH MEALS. 180 Tab 0   • Blood Glucose Monitoring Suppl Device Meter: Dispense Device of Insurance Preference. Sig. Use as directed for blood sugar monitoring. #1. NR. 1 Device 0   • Blood Glucose Monitoring Suppl Supplies Misc Test strips order: Test strips for covered by insurance meter. Sig: use once daily and prn ssx high or low sugar #100 RF x 3 100 Strip 3     No current facility-administered medications for this visit.        Allergies: No Known Allergies     Social History   Substance Use Topics   • Smoking status: Light Tobacco Smoker     Types: Cigarettes   • Smokeless tobacco: Never Used      Comment: Patient is smoking 1-2x/week   • Alcohol use Yes       Family History   Problem Relation Age of Onset   • Kidney Disease Mother    • Diabetes Mother    • Hypertension Mother    • Diabetes Father          ROS  Pertinent ROS findings as above.   All other systems reviewed and are negative except as per HPI.     Objective:     /80   Pulse 79   Temp 36.5 °C (97.7 °F)   Wt 97.8 kg (215 lb 9.8 oz)   SpO2 97%   BMI 26.95 kg/m²  Body mass index is 26.95 kg/m².     Alert, oriented in no acute distress.  Eye contact is good, speech goal directed, affect calm.  HEENT: EOMI, PERRL, conjunctiva non-injected, sclera non-icteric.  Nares patent with no significant congestion or drainage.  Luisana pinnae, external auditory canals, TM pearly gray with normal light reflex bilaterally.  Oral mucous membranes pink and moist with no lesions or thrush.  Neck supple with no cervical lymphadenopathy, JVD, palpable thyroid nodules or carotid bruits.  Lungs: clear to  auscultation bilaterally with good excursion.  CV: regular rate and rhythm.  Abdomen soft, non-distended, non-tender with normal bowel sounds. No CVAT  Lower extremities warm with no edema.  Feet are examined within normal limits    Lab Results   Component Value Date/Time    HBA1C 9.5 12/05/2017 11:02 AM          Assessment/Plan:       1. Type 2 diabetes mellitus without complication, without long-term current use of insulin (CMS-[facility])  Diabetic Monofilament LE Exam    MICROALBUMIN CREAT RATIO URINE   2. Strep throat  amoxicillin-clavulanate (AUGMENTIN) 875-125 MG Tab       DM2 A1c is not at goal recheck in March.    -RN-CDE notes reviewed and discussed.  -Discussed diet, exercise, disease management and weight loss goals.   -Education and advice provided today: See RN-CDE note.    Additional education, advice, refills, and referrals per order he will continue metformin 500 mg daily patient will also continue his supplements as well as diet modifications. Counseled patient regarding antibiotic for strep throat consultation regarding warm salt water gargles. Patient will follow-up in March.    Patient is encouraged to be seen in the emergency room for chest pain, palpitations, shortness of breath, dizziness, severe abdominal pain or other concerning symptoms.      Follow-up:   No Follow-up on file. sooner should new symptoms or problems arise.    The total time spent seeing the patient in consultation, and formulating an action plan for this visit was 40 minutes.  Greater than 50% of this time was spent counseling, discussing problems documented above, coordinating care and answering questions by the provider and registered nurse--certified diabetes educator.

## 2018-01-22 NOTE — ASSESSMENT & PLAN NOTE
Currently on metformin, artichoke. Reports compliance with medications. Checks BG onc times a day. Fasting BG in the morning is typically in 120's-130's range. reports can feel the low BG symptoms well. Denies numbness or tingling in feet. Last eye exam was June. Is following consistent carb diet or counting carbs. reports regular exercise routine, walking and bicycle. Weight is stable over the past year.

## 2018-01-22 NOTE — LETTER
Request for Medical Records    Patient Name: Gianluca Jasso    : 1975      Dear Doctor: CONSTANTIN TOTH     The above named patient receives primary care at the [facility] by CLAUDE Enriquez.  The patient informs us that you are his eye care Provider.    Please fax a copy of the most recent eye exam to [phone] or answer the  questions below and fax this sheet back to us at the above number.  Attached is a signed Release of Information.      Date of last eye exam: _____________    Retinal eye exam summary:        Please select the choice(s) that apply.    ____ No diabetic retinopathy    ____    Diabetic retinopathy present      Printed Name and Credentials: __________________________________    Signature of Eye Care Provider: _________________________________    We appreciate your assistance and collaboration in providing efficient patient care!    Kindest Regards,    [address]  [facility]  [address]  [address]  [place] [phone]

## 2018-01-22 NOTE — ASSESSMENT & PLAN NOTE
Started Friday. States it is sharp pain. States he is coughing up yellow mucous. Patient states that he is having a cough, headache states he has not really had any fevers. Patient states that he has not tried any make it better or worse.

## 2018-01-22 NOTE — LETTER
[facility]  LESA EnriquezP.RJENNIFER  [address]  [place] [address]  Fax: [phone]   Authorization for Release/Disclosure of   Protected Health Information   Name: GIANLUCA VALENTINE : 1975 SSN: [id number]   Address: [address] [place] [address]  [place] [address] Phone:    [phone] (home)    I authorize the entity listed below to release/disclose the PHI below to:   [facility]/KARLA Enriquez.P.R.RAMYA and CLAUDE Enriquez   Provider or Entity Name:  NAVNEETCONSTANTIN    Address   City, State, [facility]   Phone:      Fax:  [phone]   Reason for request: continuity of care   Information to be released:    [  ] LAST COLONOSCOPY,  including any PATH REPORT and follow-up  [  ] LAST FIT/COLOGUARD RESULT [  ] LAST DEXA  [  ] LAST MAMMOGRAM  [  ] LAST PAP  [  ] LAST LABS [ x ] RETINA EXAM REPORT  [  ] IMMUNIZATION RECORDS  [  ] Release all info      [  ] Check here and initial the line next to each item to release ALL health information INCLUDING  _____ Care and treatment for drug and / or alcohol abuse  _____ HIV testing, infection status, or AIDS  _____ Genetic Testing    DATES OF SERVICE OR TIME PERIOD TO BE DISCLOSED: _____________  I understand and acknowledge that:  * This Authorization may be revoked at any time by you in writing, except if your health information has already been used or disclosed.  * Your health information that will be used or disclosed as a result of you signing this authorization could be re-disclosed by the recipient. If this occurs, your re-disclosed health information may no longer be protected by State or Federal laws.  * You may refuse to sign this Authorization. Your refusal will not affect your ability to obtain treatment.  * This Authorization becomes effective upon signing and will  on (date) __________.      If no date is indicated, this Authorization will  one (1) year from the signature date.    Name: Gianluca  Romero    Signature:   Date:     1/22/2018       PLEASE FAX REQUESTED RECORDS BACK TO: [phone]

## 2018-01-23 ENCOUNTER — TELEPHONE (OUTPATIENT)
Dept: MEDICAL GROUP | Facility: PHYSICIAN GROUP | Age: 43
End: 2018-01-23

## 2018-01-23 NOTE — TELEPHONE ENCOUNTER
Phone Number Called: [phone] (home)       Message: pt informed by phone. Pt had no questions. TW    Left Message for patient to call back: no

## 2018-01-23 NOTE — TELEPHONE ENCOUNTER
----- Message from CLAUDE Enriquez sent at 1/23/2018  6:18 AM PST -----  Please call pt and give lab results: Microalbumin creatinine ratio is within normal limits.    .

## 2018-05-24 ENCOUNTER — OFFICE VISIT (OUTPATIENT)
Dept: MEDICAL GROUP | Facility: PHYSICIAN GROUP | Age: 43
End: 2018-05-24
Payer: COMMERCIAL

## 2018-05-24 VITALS
SYSTOLIC BLOOD PRESSURE: 116 MMHG | RESPIRATION RATE: 16 BRPM | DIASTOLIC BLOOD PRESSURE: 80 MMHG | BODY MASS INDEX: 27.1 KG/M2 | OXYGEN SATURATION: 97 % | HEART RATE: 72 BPM | TEMPERATURE: 98.7 F | WEIGHT: 218 LBS | HEIGHT: 75 IN

## 2018-05-24 DIAGNOSIS — E11.9 TYPE 2 DIABETES MELLITUS WITHOUT COMPLICATION, WITHOUT LONG-TERM CURRENT USE OF INSULIN (HCC): ICD-10-CM

## 2018-05-24 DIAGNOSIS — J39.2 LESION OF THROAT: ICD-10-CM

## 2018-05-24 DIAGNOSIS — M54.32 LEFT SIDED SCIATICA: ICD-10-CM

## 2018-05-24 PROBLEM — J02.0 STREP THROAT: Status: RESOLVED | Noted: 2018-01-22 | Resolved: 2018-05-24

## 2018-05-24 PROCEDURE — 99214 OFFICE O/P EST MOD 30 MIN: CPT | Performed by: NURSE PRACTITIONER

## 2018-05-24 ASSESSMENT — PAIN SCALES - GENERAL: PAINLEVEL: NO PAIN

## 2018-05-24 NOTE — PROGRESS NOTES
Chief Complaint   Patient presents with   • Diabetes     follow up        HISTORY OF PRESENT ILLNESS: Patient is a 42 y.o. male established patient who presents today to discuss diabetes.    Left sided sciatica  Chronic in nature. Improved. No current pain.    Lesion of throat  Patient states he has not been seen by ENT at this time. Encouraged to follow-up with ENT.    Type 2 diabetes mellitus without complication, without long-term current use of insulin (CMS-HCC) ([facility])  Currently on metformin. States that blood sugars in the morning has been averaging 120s. States that he did have an issue with his meter. Does report being able to feel low blood sugar well. Denies numbness or tingling in his feet. Last eye exam June 17, consistent carb diet. Port site regular exercise routine continues to ride his bike. Weight is stable.       Patient Active Problem List    Diagnosis Date Noted   • Type 2 diabetes mellitus without complication, without long-term current use of insulin ([facility]) 12/05/2017   • Left sided sciatica 11/30/2017   • Lesion of throat 11/30/2017       Allergies:Patient has no known allergies.    Current Outpatient Prescriptions   Medication Sig Dispense Refill   • metFORMIN (GLUCOPHAGE) 500 MG Tab TAKE 1 TAB BY MOUTH 2 TIMES A DAY, WITH MEALS. 180 Tab 0   • Blood Glucose Monitoring Suppl Device Meter: Dispense Device of Insurance Preference. Sig. Use as directed for blood sugar monitoring. #1. NR. 1 Device 0   • Blood Glucose Monitoring Suppl Supplies Misc Test strips order: Test strips for covered by insurance meter. Sig: use once daily and prn ssx high or low sugar #100 RF x 3 100 Strip 3     No current facility-administered medications for this visit.        Social History   Substance Use Topics   • Smoking status: Light Tobacco Smoker     Types: Cigarettes   • Smokeless tobacco: Never Used      Comment: Patient is smoking 1-2x/week   • Alcohol use Yes       Family Status   Relation Status   • Mother  "Alive   • Father Alive     Family History   Problem Relation Age of Onset   • Kidney Disease Mother    • Diabetes Mother    • Hypertension Mother    • Diabetes Father        Review of Systems:   Constitutional:  Negative for fever, chills, weight loss and malaise/fatigue.   HEENT:  Negative for ear pain, nosebleeds, congestion, sore throat and neck pain.    Eyes:  Negative for blurred vision.   Respiratory:  Negative for cough, sputum production, shortness of breath and wheezing.    Cardiovascular:  Negative for chest pain, palpitations, orthopnea and leg swelling.   Gastrointestinal:  Negative for heartburn, nausea, vomiting and abdominal pain.   Genitourinary:  Negative for dysuria, urgency and frequency.   Musculoskeletal:  Negative for myalgias, back pain and joint pain.   Skin:  Negative for rash and itching.   Neurological:  Negative for dizziness, tingling, tremors, sensory change, focal weakness and headaches.   Endo/Heme/Allergies:  Does not bruise/bleed easily.   Psychiatric/Behavioral:  Negative for depression, suicidal ideas and memory loss.  The patient is not nervous/anxious and does not have insomnia.    All other systems reviewed and are negative except as in HPI.    Exam:  Blood pressure 116/80, pulse 72, temperature 37.1 °C (98.7 °F), resp. rate 16, height 1.905 m (6' 3\"), weight 98.9 kg (218 lb), SpO2 97 %.  General:  Normal appearing. No distress.  HEENT:  Normocephalic. Eyes conjunctiva clear lids without ptosis, pupils equal and reactive to light accommodation, ears normal shape and contour, canals are clear bilaterally, tympanic membranes are benign, nasal mucosa benign, oropharynx is without erythema, edema or exudates. +pink protruding flap of skin, unchanged.  Pulmonary:  Clear to ausculation.  Normal effort. No rales, ronchi, or wheezing.  Cardiovascular:  Regular rate and rhythm without murmur. Carotid and radial pulses are intact and equal bilaterally.  Neurologic:  Grossly " nonfocal  Skin:  Warm and dry.  No obvious lesions.  Musculoskeletal:  Normal gait. No extremity cyanosis, clubbing, or edema.  Psych:  Normal mood and affect. Alert and oriented x3. Judgment and insight is normal.  Declines foot exam.    PLAN:    1. Type 2 diabetes mellitus without complication, without long-term current use of insulin (HCC)  Patient will complete labs.  Patient will continue metformin 500 mg twice daily, denies side effects on this medication.  - COMP METABOLIC PANEL; Future  - LIPID PROFILE; Future  - HEMOGLOBIN A1C; Future    2. Left sided sciatica  Follow-up as needed    3. Lesion of throat  Encouraged to follow-up with ENT.    Follow-up in 3 months or sooner as needed.Patient is encouraged to be seen in the emergency room for chest pain, palpitations, shortness of breath, dizziness, severe abdominal pain or other concerning symptoms.        Please note that this dictation was created using voice recognition software. I have made every reasonable attempt to correct obvious errors, but I expect that there are errors of grammar and possibly content that I did not discover before finalizing the note.    Assessment/Plan:  1. Type 2 diabetes mellitus without complication, without long-term current use of insulin (HCC)  COMP METABOLIC PANEL    LIPID PROFILE    HEMOGLOBIN A1C   2. Left sided sciatica     3. Lesion of throat            I have placed the below orders and discussed them with an approved delegating provider. The MA is performing the below orders under the direction of Dr. Richardson.

## 2018-05-24 NOTE — ASSESSMENT & PLAN NOTE
Currently on metformin. States that blood sugars in the morning has been averaging 120s. States that he did have an issue with his meter. Does report being able to feel low blood sugar well. Denies numbness or tingling in his feet. Last eye exam June 17, consistent carb diet. Port site regular exercise routine continues to ride his bike. Weight is stable.

## 2018-06-13 LAB
ALBUMIN SERPL-MCNC: 4.6 G/DL (ref 3.5–5.5)
ALBUMIN/GLOB SERPL: 1.9 {RATIO} (ref 1.2–2.2)
ALP SERPL-CCNC: 95 IU/L (ref 39–117)
ALT SERPL-CCNC: 39 IU/L (ref 0–44)
AST SERPL-CCNC: 49 IU/L (ref 0–40)
BILIRUB SERPL-MCNC: 0.5 MG/DL (ref 0–1.2)
BUN SERPL-MCNC: 12 MG/DL (ref 6–24)
BUN/CREAT SERPL: 13 (ref 9–20)
CALCIUM SERPL-MCNC: 9.6 MG/DL (ref 8.7–10.2)
CHLORIDE SERPL-SCNC: 101 MMOL/L (ref 96–106)
CHOLEST SERPL-MCNC: 220 MG/DL (ref 100–199)
CO2 SERPL-SCNC: 23 MMOL/L (ref 20–29)
CREAT SERPL-MCNC: 0.93 MG/DL (ref 0.76–1.27)
GFR SERPLBLD CREATININE-BSD FMLA CKD-EPI: 100 ML/MIN/1.73
GFR SERPLBLD CREATININE-BSD FMLA CKD-EPI: 116 ML/MIN/1.73
GLOBULIN SER CALC-MCNC: 2.4 G/DL (ref 1.5–4.5)
GLUCOSE SERPL-MCNC: 165 MG/DL (ref 65–99)
HBA1C MFR BLD: 7 % (ref 4.8–5.6)
HDLC SERPL-MCNC: 44 MG/DL
LABORATORY COMMENT REPORT: ABNORMAL
LDLC SERPL CALC-MCNC: 99 MG/DL (ref 0–99)
POTASSIUM SERPL-SCNC: 5.1 MMOL/L (ref 3.5–5.2)
PROT SERPL-MCNC: 7 G/DL (ref 6–8.5)
SODIUM SERPL-SCNC: 140 MMOL/L (ref 134–144)
TRIGL SERPL-MCNC: 385 MG/DL (ref 0–149)
VLDLC SERPL CALC-MCNC: 77 MG/DL (ref 5–40)

## 2018-06-14 ENCOUNTER — TELEPHONE (OUTPATIENT)
Dept: MEDICAL GROUP | Facility: PHYSICIAN GROUP | Age: 43
End: 2018-06-14

## 2018-06-14 NOTE — TELEPHONE ENCOUNTER
----- Message from CLAUDE Enriquez sent at 6/14/2018 12:40 PM PDT -----  Please call pt and give lab results: Hemoglobin A1c is improved at 7.0. AST is mildly elevated and cholesterol is noted to be significantly elevated, we will discuss you cholesterol at your appointment in August please work on healthy diet, exercise, increase omega-3 fatty acids. I would recommend decreasing fatty or fried foods in her diet, decreasing processed foods and fast foods.

## 2018-06-14 NOTE — TELEPHONE ENCOUNTER
VOICEMAIL  1. Caller Name:                          Call Back Number: [phone] (home)       2. Message: Called and left patient a message to call to get lab results. LM     3. Patient approves office to leave a detailed voicemail/MyChart message: N\A

## 2018-06-15 NOTE — TELEPHONE ENCOUNTER
VOICEMAIL  1. Caller Name: Gianluca Jasso                        Call Back Number: [phone] (home)       2. Message: Called and left patient a message to call back to get lab results. LM     3. Patient approves office to leave a detailed voicemail/MyChart message: N\A

## 2018-08-23 ENCOUNTER — OFFICE VISIT (OUTPATIENT)
Dept: MEDICAL GROUP | Facility: PHYSICIAN GROUP | Age: 43
End: 2018-08-23
Payer: COMMERCIAL

## 2018-08-23 VITALS
SYSTOLIC BLOOD PRESSURE: 118 MMHG | BODY MASS INDEX: 27.23 KG/M2 | TEMPERATURE: 98 F | WEIGHT: 219 LBS | HEART RATE: 70 BPM | HEIGHT: 75 IN | OXYGEN SATURATION: 97 % | RESPIRATION RATE: 14 BRPM | DIASTOLIC BLOOD PRESSURE: 78 MMHG

## 2018-08-23 DIAGNOSIS — E11.9 TYPE 2 DIABETES MELLITUS WITHOUT COMPLICATION, WITHOUT LONG-TERM CURRENT USE OF INSULIN (HCC): ICD-10-CM

## 2018-08-23 DIAGNOSIS — E78.1 HYPERTRIGLYCERIDEMIA: ICD-10-CM

## 2018-08-23 LAB
HBA1C MFR BLD: 8 % (ref ?–5.8)
INT CON NEG: NEGATIVE
INT CON POS: POSITIVE

## 2018-08-23 PROCEDURE — 83036 HEMOGLOBIN GLYCOSYLATED A1C: CPT | Performed by: NURSE PRACTITIONER

## 2018-08-23 PROCEDURE — 99214 OFFICE O/P EST MOD 30 MIN: CPT | Performed by: NURSE PRACTITIONER

## 2018-08-23 RX ORDER — ATORVASTATIN CALCIUM 40 MG/1
40 TABLET, FILM COATED ORAL EVERY EVENING
Qty: 90 TAB | Refills: 3 | Status: SHIPPED | OUTPATIENT
Start: 2018-08-23 | End: 2019-12-18

## 2018-08-23 ASSESSMENT — PAIN SCALES - GENERAL: PAINLEVEL: NO PAIN

## 2018-08-23 NOTE — ASSESSMENT & PLAN NOTE
Chronic in nature.  Worsening.  Discussed with patient the importance of decreasing triglycerides at this time plan to start patient on atorvastatin 40 mg daily.  Discussed risks, benefits, side effects and all patient questions are answered at this time.

## 2018-08-23 NOTE — PROGRESS NOTES
Chief Complaint   Patient presents with   • Results     cholesterol high        HISTORY OF PRESENT ILLNESS: Patient is a 42 y.o. male established patient who presents today to discuss diabetes.    Type 2 diabetes mellitus without complication, without long-term current use of insulin (CMS-HCC) ([facility])  Chronic in nature.  Worsening.  Hemoglobin A1c today is 8.0. patient states that he has been drinking 3 beers per night as well as some soda recently.  Patient denies symptoms of hyper or hypoglycemia states that he has been overall feeling well.  Patient denies numbness or tingling in her feet his feet.  Patient does mention some changes in erections. Currently on Metformin. Reports compliance with medications, but does not wish to increase his dose as recommended. Last eye exam was up-to-date.  Is not following consistent carb diet or counting carbs.  Reports regular exercise routine. Weight is stable over the past year.       Hypertriglyceridemia  Chronic in nature.  Worsening.  Discussed with patient the importance of decreasing triglycerides at this time plan to start patient on atorvastatin 40 mg daily.  Discussed risks, benefits, side effects and all patient questions are answered at this time.      Patient Active Problem List    Diagnosis Date Noted   • Hypertriglyceridemia 08/23/2018   • Type 2 diabetes mellitus without complication, without long-term current use of insulin ([facility]) 12/05/2017   • Left sided sciatica 11/30/2017   • Lesion of throat 11/30/2017       Allergies:Patient has no known allergies.    Current Outpatient Prescriptions   Medication Sig Dispense Refill   • atorvastatin (LIPITOR) 40 MG Tab Take 1 Tab by mouth every evening. 90 Tab 3   • metFORMIN (GLUCOPHAGE) 500 MG Tab TAKE 1 TAB BY MOUTH 2 TIMES A DAY, WITH MEALS. 180 Tab 3   • Blood Glucose Monitoring Suppl Device Meter: Dispense Device of Insurance Preference. Sig. Use as directed for blood sugar monitoring. #1. NR. 1 Device 0   • Blood  "Glucose Monitoring Suppl Supplies Misc Test strips order: Test strips for covered by insurance meter. Sig: use once daily and prn ssx high or low sugar #100 RF x 3 100 Strip 3     No current facility-administered medications for this visit.        Social History   Substance Use Topics   • Smoking status: Light Tobacco Smoker     Types: Cigarettes   • Smokeless tobacco: Never Used      Comment: Patient is smoking 1-2x/week   • Alcohol use Yes       Family Status   Relation Status   • Mo Alive   • Fa Alive     Family History   Problem Relation Age of Onset   • Kidney Disease Mother    • Diabetes Mother    • Hypertension Mother    • Diabetes Father        Review of Systems:   Constitutional:  Negative for fever, chills, weight loss and malaise/fatigue.   HEENT:  Negative for ear pain, nosebleeds, congestion, sore throat and neck pain.    Eyes:  Negative for blurred vision.   Respiratory:  Negative for cough, sputum production, shortness of breath and wheezing.    Cardiovascular:  Negative for chest pain, palpitations, orthopnea and leg swelling.   Gastrointestinal:  Negative for heartburn, nausea, vomiting and abdominal pain.   Genitourinary:  Negative for dysuria, urgency and frequency.   Musculoskeletal: Positive for myalgias, negative back pain and joint pain.   Skin:  Negative for rash and itching.   Neurological:  Negative for dizziness, tingling, tremors, sensory change, focal weakness and headaches.   Endo/Heme/Allergies:  Does not bruise/bleed easily.   Psychiatric/Behavioral:  Negative for depression, suicidal ideas and memory loss.  The patient is not nervous/anxious and does not have insomnia.    All other systems reviewed and are negative except as in HPI.    Exam:  Blood pressure 118/78, pulse 70, temperature 36.7 °C (98 °F), resp. rate 14, height 1.905 m (6' 3\"), weight 99.3 kg (219 lb), SpO2 97 %.  General:  Normal appearing. No distress.  Pulmonary:  Clear to ausculation.  Normal effort. No rales, " "ronchi, or wheezing.  Cardiovascular:  Regular rate and rhythm without murmur. Carotid and radial pulses are intact and equal bilaterally.  Neurologic:  Grossly nonfocal  Skin:  Warm and dry.  No obvious lesions.  Musculoskeletal:  Normal gait. No extremity cyanosis, clubbing, or edema.  Psych:  Normal mood and affect. Alert and oriented x3. Judgment and insight is normal.      PLAN:    1. Type 2 diabetes mellitus without complication, without long-term current use of insulin (HCC)  Hemoglobin A1c is increased at 8.0 today.  Patient refuses increase in metformin at this time states that he will change his diet and stop drinking increased amounts of beer and soda.    - POCT  A1C  - HEMOGLOBIN A1C; Future    2. Hypertriglyceridemia  Plan to start patient on atorvastatin related to significantly elevated triglycerides at this time.  Patient is counseled regarding risks, benefits, side effects of medication.  - atorvastatin (LIPITOR) 40 MG Tab; Take 1 Tab by mouth every evening.  Dispense: 90 Tab; Refill: 3  - LIPID PROFILE; Future    With regard to erections discussed with patient pathophysiology of erectile dysfunction in patients with diabetes.  Patient states that sex life is satisfactory at this time he is having regular erections, morning erections but states that these have \"changed some\" over time.  Discussed with patient revisiting this when it is becoming a concern.    Patient has also increased his exercise and has been playing volleyball consistently states that he is having some muscle aches in his upper shoulders which have been improved with ibuprofen discussed with patient that muscle soreness is normal, discussed signs or symptoms of injury.  Discussed caution.    Follow-up in 3 months or sooner as needed for diabetes. Patient is encouraged to be seen in the emergency room for chest pain, palpitations, shortness of breath, dizziness, severe abdominal pain or other concerning symptoms.      Please note " that this dictation was created using voice recognition software. I have made every reasonable attempt to correct obvious errors, but I expect that there are errors of grammar and possibly content that I did not discover before finalizing the note.    Assessment/Plan:  1. Type 2 diabetes mellitus without complication, without long-term current use of insulin (HCC)  POCT  A1C    HEMOGLOBIN A1C   2. Hypertriglyceridemia  atorvastatin (LIPITOR) 40 MG Tab    LIPID PROFILE          I have placed the below orders and discussed them with an approved delegating provider. The MA is performing the below orders under the direction of Dr. Richardson.

## 2018-08-23 NOTE — ASSESSMENT & PLAN NOTE
Chronic in nature.  Worsening.  Hemoglobin A1c today is 8.0. patient states that he has been drinking 3 beers per night as well as some soda recently.  Patient denies symptoms of hyper or hypoglycemia states that he has been overall feeling well.  Patient denies numbness or tingling in her feet his feet.  Patient does mention some changes in erections. Currently on Metformin. Reports compliance with medications, but does not wish to increase his dose as recommended. Last eye exam was up-to-date.  Is not following consistent carb diet or counting carbs.  Reports regular exercise routine. Weight is stable over the past year.

## 2019-06-22 LAB
CHOLEST SERPL-MCNC: 213 MG/DL (ref 100–199)
HBA1C MFR BLD: 8.9 % (ref 4.8–5.6)
HDLC SERPL-MCNC: 49 MG/DL
LABORATORY COMMENT REPORT: ABNORMAL
LDLC SERPL CALC-MCNC: 123 MG/DL (ref 0–99)
TRIGL SERPL-MCNC: 207 MG/DL (ref 0–149)
VLDLC SERPL CALC-MCNC: 41 MG/DL (ref 5–40)

## 2019-06-24 ENCOUNTER — OFFICE VISIT (OUTPATIENT)
Dept: MEDICAL GROUP | Facility: PHYSICIAN GROUP | Age: 44
End: 2019-06-24
Payer: COMMERCIAL

## 2019-06-24 VITALS
RESPIRATION RATE: 16 BRPM | OXYGEN SATURATION: 97 % | HEART RATE: 63 BPM | BODY MASS INDEX: 27.23 KG/M2 | HEIGHT: 75 IN | DIASTOLIC BLOOD PRESSURE: 82 MMHG | WEIGHT: 219 LBS | SYSTOLIC BLOOD PRESSURE: 128 MMHG | TEMPERATURE: 98.1 F

## 2019-06-24 DIAGNOSIS — E11.9 TYPE 2 DIABETES MELLITUS WITHOUT COMPLICATION, WITHOUT LONG-TERM CURRENT USE OF INSULIN (HCC): ICD-10-CM

## 2019-06-24 DIAGNOSIS — E78.1 HYPERTRIGLYCERIDEMIA: ICD-10-CM

## 2019-06-24 DIAGNOSIS — H60.311 ACUTE DIFFUSE OTITIS EXTERNA OF RIGHT EAR: ICD-10-CM

## 2019-06-24 PROCEDURE — 99214 OFFICE O/P EST MOD 30 MIN: CPT | Performed by: NURSE PRACTITIONER

## 2019-06-24 ASSESSMENT — PATIENT HEALTH QUESTIONNAIRE - PHQ9: CLINICAL INTERPRETATION OF PHQ2 SCORE: 0

## 2019-09-30 ENCOUNTER — OFFICE VISIT (OUTPATIENT)
Dept: MEDICAL GROUP | Facility: PHYSICIAN GROUP | Age: 44
End: 2019-09-30
Payer: COMMERCIAL

## 2019-09-30 VITALS
SYSTOLIC BLOOD PRESSURE: 122 MMHG | BODY MASS INDEX: 27.35 KG/M2 | RESPIRATION RATE: 16 BRPM | HEART RATE: 67 BPM | DIASTOLIC BLOOD PRESSURE: 80 MMHG | WEIGHT: 220 LBS | OXYGEN SATURATION: 97 % | HEIGHT: 75 IN | TEMPERATURE: 97.7 F

## 2019-09-30 DIAGNOSIS — E11.9 TYPE 2 DIABETES MELLITUS WITHOUT COMPLICATION, WITHOUT LONG-TERM CURRENT USE OF INSULIN (HCC): ICD-10-CM

## 2019-09-30 DIAGNOSIS — Z23 NEED FOR VACCINATION: ICD-10-CM

## 2019-09-30 PROCEDURE — 92250 FUNDUS PHOTOGRAPHY W/I&R: CPT | Mod: 26 | Performed by: NURSE PRACTITIONER

## 2019-09-30 PROCEDURE — 90686 IIV4 VACC NO PRSV 0.5 ML IM: CPT | Performed by: NURSE PRACTITIONER

## 2019-09-30 PROCEDURE — 90471 IMMUNIZATION ADMIN: CPT | Performed by: NURSE PRACTITIONER

## 2019-09-30 PROCEDURE — 99214 OFFICE O/P EST MOD 30 MIN: CPT | Mod: 25 | Performed by: NURSE PRACTITIONER

## 2019-09-30 NOTE — PROGRESS NOTES
Chief Complaint   Patient presents with   • Diabetes       HISTORY OF PRESENT ILLNESS: Patient is a 44 y.o. male established patient who presents today to follow up on Type 2 Diabetes.    Type 2 diabetes mellitus without complication, without long-term current use of insulin (CMS-HCC) ([facility])  Chronic in nature. Improving. Hemoglobin A1C today is 7.4. Patient states that he has been consistent on eating a healthy diet. Has been walking 30 minutes a day. Patient denies symptoms of hyperglycemia. States he sometimes feels as though he is getting a front temporal headache midday and concerned about being hypoglycemic. Denies numbness or tingling in his feet. Currently on Metformin and compliant with medication. States he moved about 3 weeks ago and was not checking his blood sugars for awhile. Ran out of test strips, so he went and bought a new machine. Blood sugars have been between 110-140 at home.      Patient Active Problem List    Diagnosis Date Noted   • Hypertriglyceridemia 08/23/2018   • Type 2 diabetes mellitus without complication, without long-term current use of insulin ([facility]) 12/05/2017   • Left sided sciatica 11/30/2017   • Lesion of throat 11/30/2017       Allergies:Patient has no known allergies.    Current Outpatient Medications   Medication Sig Dispense Refill   • metFORMIN (GLUCOPHAGE) 500 MG Tab Take 1 Tab by mouth 2 times a day, with meals. 180 Tab 3   • atorvastatin (LIPITOR) 40 MG Tab Take 1 Tab by mouth every evening. 90 Tab 3   • Blood Glucose Monitoring Suppl Device Meter: Dispense Device of Insurance Preference. Sig. Use as directed for blood sugar monitoring. #1. NR. 1 Device 0   • Blood Glucose Monitoring Suppl Supplies Misc Test strips order: Test strips for covered by insurance meter. Sig: use once daily and prn ssx high or low sugar #100 RF x 3 100 Strip 3     No current facility-administered medications for this visit.        Social History     Tobacco Use   • Smoking status: Light Tobacco  "Smoker     Types: Cigarettes   • Smokeless tobacco: Never Used   • Tobacco comment: Patient is smoking 1-2x/week   Substance Use Topics   • Alcohol use: Yes   • Drug use: No       Family Status   Relation Name Status   • Mo  Alive   • Fa  Alive     Family History   Problem Relation Age of Onset   • Kidney Disease Mother    • Diabetes Mother    • Hypertension Mother    • Diabetes Father        Review of Systems:   Constitutional: Negative for fever, chills, weight loss and malaise/fatigue.   HEENT: Negative for ear pain, nosebleeds, congestion, sore throat and neck pain.    Eyes: Negative for blurred vision.   Respiratory: Negative for cough, sputum production, shortness of breath and wheezing.    Cardiovascular: Negative for chest pain, palpitations, orthopnea and leg swelling.   Gastrointestinal: Negative for heartburn, nausea, vomiting and abdominal pain.   Genitourinary: Negative for dysuria, urgency and frequency.   Musculoskeletal: Negative for myalgias, back pain and joint pain.   Skin: Negative for rash and itching.   Neurological: Negative for dizziness, tingling, tremors, sensory change, focal weakness and headaches.   Endo/Heme/Allergies: Does not bruise/bleed easily.   Psychiatric/Behavioral: Negative for depression, suicidal ideas and memory loss.  The patient is not nervous/anxious and does not have insomnia.    All other systems reviewed and are negative except as in HPI.    Exam:  /80 (BP Location: Left arm, Patient Position: Sitting, BP Cuff Size: Adult)   Pulse 67   Temp 36.5 °C (97.7 °F) (Temporal)   Resp 16   Ht 1.905 m (6' 3\")   Wt 99.8 kg (220 lb)   SpO2 97%   General: Normal appearing. No distress.  HEENT: Normocephalic. Eyes conjunctiva clear lids without ptosis, pupils equal and reactive to light accommodation, ears normal shape and contour, canals are clear bilaterally, tympanic membranes are benign, nasal mucosa benign, oropharynx is without erythema, edema or exudates.   Neck: " Supple without JVD or bruit. Thyroid is not enlarged.  Pulmonary: Clear to ausculation.  Normal effort. No rales, ronchi, or wheezing.  Cardiovascular: Regular rate and rhythm without murmur. Carotid and radial pulses are intact and equal bilaterally.  Abdomen: Soft, nontender, nondistended. Normal bowel sounds. Liver and spleen are not palpable  Neurologic: Grossly nonfocal  Skin: Warm and dry.  No obvious lesions. Foot exam complete, no open sores or lesions, sensation intact throughout.  Musculoskeletal: Normal gait. No extremity cyanosis, clubbing, or edema.  Psych: Normal mood and affect. Alert and oriented x3. Judgment and insight is normal.    Monofilament testing with a 10 gram force: sensation intact: intact bilaterally  Visual Inspection: Feet without maceration, ulcers, fissures.  Pedal pulses: intact bilaterally    PLAN:    1. Need for vaccination  - Influenza Vaccine Quad Injection (PF)    2. Type 2 diabetes mellitus without complication, without long-term current use of insulin (HCC)  Continue to take Metformin 500mg BID.  Educated on the importance of taking his blood sugar, especially when he starts to feel a headache and his eyes hurting. Educated on having a snack with him in case of a hypoglycemic episode.  Patient to report a log of blood sugars when he has midday headache.  Continue healthy diet and exercise.  - POCT Retinal Eye Exam  - POCT  A1C  - Comp Metabolic Panel; Future  - Lipid Profile; Future  - MICROALBUMIN CREAT RATIO URINE; Future  - Diabetic Monofilament Lower Extremity Exam    Follow up in 6 months or sooner as needed. Patient is encouraged to be seen in the emergency room for chest pain, palpitations, shortness of breath, dizziness, severe abdominal pain or other concerning symptoms.    Please note that this dictation was created using voice recognition software. I have made every reasonable attempt to correct obvious errors, but I expect that there are errors of grammar and  possibly content that I did not discover before finalizing the note.    Assessment/Plan:  1. Need for vaccination  Influenza Vaccine Quad Injection (PF)   2. Type 2 diabetes mellitus without complication, without long-term current use of insulin (HCC)  POCT Retinal Eye Exam    POCT  A1C    Comp Metabolic Panel    Lipid Profile    MICROALBUMIN CREAT RATIO URINE    Diabetic Monofilament Lower Extremity Exam          I have placed the below orders and discussed them with an approved delegating provider. The MA is performing the below orders under the direction of Dr. Richardson.

## 2019-09-30 NOTE — ASSESSMENT & PLAN NOTE
Chronic in nature. Improving. Hemoglobin A1C today is 7.4. Patient states that he has been consistent on eating a healthy diet. Has been walking 30 minutes a day. Patient denies symptoms of hyperglycemia. States he sometimes feels as though he is getting a front temporal headache midday and concerned about being hypoglycemic. Denies numbness or tingling in his feet. Currently on Metformin and compliant with medication. States he moved about 3 weeks ago and was not checking his blood sugars for awhile. Ran out of test strips, so he went and bought a new machine. Blood sugars have been between 110-140 at home.

## 2019-10-07 LAB — RETINAL SCREEN: NEGATIVE

## 2019-12-14 LAB
ALBUMIN SERPL-MCNC: 4.6 G/DL (ref 3.5–5.5)
ALBUMIN/CREAT UR: 3.8 MG/G CREAT (ref 0–30)
ALBUMIN/GLOB SERPL: 1.8 {RATIO} (ref 1.2–2.2)
ALP SERPL-CCNC: 86 IU/L (ref 39–117)
ALT SERPL-CCNC: 26 IU/L (ref 0–44)
AST SERPL-CCNC: 17 IU/L (ref 0–40)
BILIRUB SERPL-MCNC: 0.4 MG/DL (ref 0–1.2)
BUN SERPL-MCNC: 14 MG/DL (ref 6–24)
BUN/CREAT SERPL: 16 (ref 9–20)
CALCIUM SERPL-MCNC: 9.6 MG/DL (ref 8.7–10.2)
CHLORIDE SERPL-SCNC: 101 MMOL/L (ref 96–106)
CHOLEST SERPL-MCNC: 230 MG/DL (ref 100–199)
CO2 SERPL-SCNC: 20 MMOL/L (ref 20–29)
CREAT SERPL-MCNC: 0.87 MG/DL (ref 0.76–1.27)
CREAT UR-MCNC: 170.2 MG/DL
GLOBULIN SER CALC-MCNC: 2.5 G/DL (ref 1.5–4.5)
GLUCOSE SERPL-MCNC: 155 MG/DL (ref 65–99)
HDLC SERPL-MCNC: 42 MG/DL
LABORATORY COMMENT REPORT: ABNORMAL
LDLC SERPL CALC-MCNC: 128 MG/DL (ref 0–99)
MICROALBUMIN UR-MCNC: 6.4 UG/ML
POTASSIUM SERPL-SCNC: 4.5 MMOL/L (ref 3.5–5.2)
PROT SERPL-MCNC: 7.1 G/DL (ref 6–8.5)
SODIUM SERPL-SCNC: 138 MMOL/L (ref 134–144)
TRIGL SERPL-MCNC: 299 MG/DL (ref 0–149)
VLDLC SERPL CALC-MCNC: 60 MG/DL (ref 5–40)

## 2019-12-16 ENCOUNTER — TELEPHONE (OUTPATIENT)
Dept: MEDICAL GROUP | Facility: PHYSICIAN GROUP | Age: 44
End: 2019-12-16

## 2019-12-16 NOTE — TELEPHONE ENCOUNTER
----- Message from Chano Ingram M.D. sent at 12/16/2019  9:11 AM PST -----  Please call patient to let him know that his cholesterol is more elevated than 5 months ago.  Total cholesterol is 230 when he used to be 213.  Triglycerides are also 299 when the used to be 207.  His bad cholesterol, LDL is 128 when he used to be 123.  I recommend reinforcing lifestyle change with more exercise weight loss and healthier eating.  Increase fish oil or omega-3 intake.  Also please make sure you continue to take Lipitor consistently every day.  These lab results will be discussed on your next appointment on 12/17/2019.    Thank you,    Chano Ingram MD  Family Medicine Physician  [facility] [place]  [phone]

## 2019-12-16 NOTE — TELEPHONE ENCOUNTER
Phone Number Called: [phone] (home)     Call outcome: spoke to patient regarding message below    Message: Spoke with patient and let them know 's message.

## 2019-12-18 ENCOUNTER — OFFICE VISIT (OUTPATIENT)
Dept: MEDICAL GROUP | Facility: PHYSICIAN GROUP | Age: 44
End: 2019-12-18
Payer: COMMERCIAL

## 2019-12-18 VITALS
DIASTOLIC BLOOD PRESSURE: 72 MMHG | OXYGEN SATURATION: 97 % | RESPIRATION RATE: 16 BRPM | SYSTOLIC BLOOD PRESSURE: 118 MMHG | BODY MASS INDEX: 27.26 KG/M2 | TEMPERATURE: 97.8 F | HEART RATE: 64 BPM | HEIGHT: 75 IN | WEIGHT: 219.2 LBS

## 2019-12-18 DIAGNOSIS — E11.9 TYPE 2 DIABETES MELLITUS WITHOUT COMPLICATION, WITHOUT LONG-TERM CURRENT USE OF INSULIN (HCC): ICD-10-CM

## 2019-12-18 LAB
HBA1C MFR BLD: 7.6 % (ref 0–5.6)
INT CON NEG: NEGATIVE
INT CON POS: POSITIVE

## 2019-12-18 PROCEDURE — 83036 HEMOGLOBIN GLYCOSYLATED A1C: CPT | Performed by: FAMILY MEDICINE

## 2019-12-18 PROCEDURE — 99214 OFFICE O/P EST MOD 30 MIN: CPT | Performed by: FAMILY MEDICINE

## 2019-12-18 RX ORDER — ATORVASTATIN CALCIUM 40 MG/1
40 TABLET, FILM COATED ORAL EVERY EVENING
Qty: 90 TAB | Refills: 3 | Status: SHIPPED | OUTPATIENT
Start: 2019-12-18 | End: 2021-09-09 | Stop reason: SDUPTHER

## 2019-12-18 NOTE — PROGRESS NOTES
"Subjective:     CC: f/u diabetes    HPI:   Gianluca is a patient of [employer] who presents today with     Type 2 diabetes mellitus without complication, without long-term current use of insulin (CMS-[facility]) ([facility])  This is a chronic condition.  Current medications: metformin 500 mg bid  Last A1c: 7.4% (9/2019), 7.6% (today)  Last Microalb/Cr ratio: normal (12/2019)  Last diabetic foot exam: 9/2019  Last retinal eye exam: 10/2019   ACEi/ARB: no  Statin: never took atorvastatin, told by PCP could exercise  Aspirin: might consider  Concomitant HTN: no - at goal  Nightly foot checks: yes    Sometimes he gets headaches after taking the metformin. He has noticed some vision changes.       No past medical history on file.    Social History     Tobacco Use   • Smoking status: Light Tobacco Smoker     Types: Cigarettes   • Smokeless tobacco: Never Used   • Tobacco comment: Patient is smoking 1-2x/week   Substance Use Topics   • Alcohol use: Yes   • Drug use: No       Current Outpatient Medications Ordered in Epic   Medication Sig Dispense Refill   • atorvastatin (LIPITOR) 40 MG Tab Take 1 Tab by mouth every evening. 90 Tab 3   • metFORMIN (GLUCOPHAGE) 500 MG Tab Take 1 Tab by mouth 2 times a day, with meals. 180 Tab 3     No current Epic-ordered facility-administered medications on file.        Allergies:  Patient has no known allergies.    Health Maintenance: Completed    ROS:  Gen: no fevers/chills  Pulm: no sob  CV: no chest pain    Objective:     Exam:  /72 (BP Location: Left arm, Patient Position: Sitting, BP Cuff Size: Adult)   Pulse 64   Temp 36.6 °C (97.8 °F) (Temporal)   Resp 16   Ht 1.905 m (6' 3\")   Wt 99.4 kg (219 lb 3.2 oz)   SpO2 97%   BMI 27.40 kg/m²  Body mass index is 27.4 kg/m².    Gen: Alert and oriented, No apparent distress.  Neck: Neck is supple without lymphadenopathy.  Lungs: Normal effort, CTA bilaterally, no wheezes, rhonchi, or rales  CV: Regular rate and rhythm. No murmurs, rubs, or " gallops.  Ext: No clubbing, cyanosis, edema.    Assessment & Plan:     44 y.o. male with the following -     1. Type 2 diabetes mellitus without complication, without long-term current use of insulin ([facility])  This is a chronic condition, uncontrolled.  He is currently on metformin 500 twice daily.  He is reporting getting headaches after the doses which is new as well as some visual changes.  He is up-to-date with his retinal eye exam but has not seen an eye doctor regarding the visual changes.  His hemoglobin A1c today is 7.6%.  He was not taking the atorvastatin but based on his recent cholesterol panel and the fact that his ASCVD risk was over 10% we have decided to have him start.  -Continue metformin 500 mg twice daily  -He will check sugars daily for the next couple of weeks as he is concerned adding an extra dose of metformin at night will cause his sugars to decrease too much.  As long as all sugar checks are over 100 I think it will be safe to add 500 mg at night.  - atorvastatin (LIPITOR) 40 MG Tab; Take 1 Tab by mouth every evening.  Dispense: 90 Tab; Refill: 3  - POCT Hemoglobin A1C    Return in about 3 months (around 3/18/2020) for f/u DM.    Please note that this dictation was created using voice recognition software. I have made every reasonable attempt to correct obvious errors, but I expect that there are errors of grammar and possibly content that I did not discover before finalizing the note.

## 2019-12-18 NOTE — ASSESSMENT & PLAN NOTE
This is a chronic condition.  Current medications: metformin 500 mg bid  Last A1c: 7.4% (9/2019), 7.6% (today)  Last Microalb/Cr ratio: normal (12/2019)  Last diabetic foot exam: 9/2019  Last retinal eye exam: 10/2019   ACEi/ARB: no  Statin: never took atorvastatin, told by PCP could exercise  Aspirin: might consider  Concomitant HTN: no - at goal  Nightly foot checks: yes    Sometimes he gets headaches after taking the metformin. He has noticed some vision changes.

## 2019-12-18 NOTE — PATIENT INSTRUCTIONS
Check blood sugar once a day but the time will change each day  Day 1 - check before breakfast  Day 2 - check between breakfast and lunch  Day 3 - check between lunch and dinner  Day 4 - check before bedtime  Repeat    Do this for 2 weeks and then bring the log in to the office

## 2020-03-18 ENCOUNTER — APPOINTMENT (OUTPATIENT)
Dept: MEDICAL GROUP | Facility: PHYSICIAN GROUP | Age: 45
End: 2020-03-18

## 2020-04-02 ENCOUNTER — TELEPHONE (OUTPATIENT)
Dept: MEDICAL GROUP | Facility: PHYSICIAN GROUP | Age: 45
End: 2020-04-02

## 2020-04-02 DIAGNOSIS — E11.9 TYPE 2 DIABETES MELLITUS WITHOUT COMPLICATION, WITHOUT LONG-TERM CURRENT USE OF INSULIN (HCC): ICD-10-CM

## 2020-04-02 NOTE — TELEPHONE ENCOUNTER
Phone Number Called: [phone] (home)       Call outcome: Spoke to patient regarding message below.    Message: Pt agreed to Virtual Visit and is set up for appt on 04/09/2020 at 7:00am, Per Provider request will need a current A1-c.          Please order labs.

## 2021-06-08 ENCOUNTER — OFFICE VISIT (OUTPATIENT)
Dept: MEDICAL GROUP | Facility: PHYSICIAN GROUP | Age: 46
End: 2021-06-08
Payer: COMMERCIAL

## 2021-06-08 ENCOUNTER — DOCUMENTATION (OUTPATIENT)
Dept: MEDICAL GROUP | Facility: PHYSICIAN GROUP | Age: 46
End: 2021-06-08

## 2021-06-08 VITALS
TEMPERATURE: 98.2 F | SYSTOLIC BLOOD PRESSURE: 110 MMHG | RESPIRATION RATE: 16 BRPM | WEIGHT: 215.8 LBS | OXYGEN SATURATION: 95 % | BODY MASS INDEX: 27.7 KG/M2 | HEIGHT: 74 IN | DIASTOLIC BLOOD PRESSURE: 72 MMHG | HEART RATE: 81 BPM

## 2021-06-08 DIAGNOSIS — Q55.61 PENILE CURVE: ICD-10-CM

## 2021-06-08 DIAGNOSIS — E11.9 TYPE 2 DIABETES MELLITUS WITHOUT COMPLICATION, WITHOUT LONG-TERM CURRENT USE OF INSULIN (HCC): ICD-10-CM

## 2021-06-08 LAB
HBA1C MFR BLD: 8.1 % (ref 0–5.6)
INT CON NEG: NEGATIVE
INT CON POS: POSITIVE

## 2021-06-08 PROCEDURE — 83036 HEMOGLOBIN GLYCOSYLATED A1C: CPT | Performed by: NURSE PRACTITIONER

## 2021-06-08 PROCEDURE — 99214 OFFICE O/P EST MOD 30 MIN: CPT | Performed by: NURSE PRACTITIONER

## 2021-06-08 RX ORDER — METFORMIN HYDROCHLORIDE EXTENDED-RELEASE TABLETS 1000 MG/1
1000 TABLET, FILM COATED, EXTENDED RELEASE ORAL
Qty: 90 TABLET | Refills: 0 | Status: SHIPPED | OUTPATIENT
Start: 2021-06-08 | End: 2021-09-07

## 2021-06-08 ASSESSMENT — PATIENT HEALTH QUESTIONNAIRE - PHQ9: CLINICAL INTERPRETATION OF PHQ2 SCORE: 0

## 2021-06-08 NOTE — PROGRESS NOTES
Chief Complaint   Patient presents with   • Requesting Labs   • Referral Needed   • Other     review blood sugar levels        HISTORY OF PRESENT ILLNESS: Patient is a 45 y.o. male established patient who presents today to diabetes    Penile curve  This is a new issue.  Patient states that he has noticed over the last few years a change in the curvature of his penis.  Patient states he noticed initially that it curved slightly to the left.  States that over the last few years the degree of curvature has increased significantly to the point that it is causing his wife discomfort.  Patient denies any pain, change in erections, decrease in morning erections, pain with urination or ejaculation, blood in his urine or semen, swelling in his scrotum, pain or erythema of the scrotum or penis.  States that his only symptom is the change in curvature and he is requesting a referral to urology today for further work-up.  Concern for Peyronie's disease.    Type 2 diabetes mellitus without complication, without long-term current use of insulin (CMS-HCC) ([facility])  Chronic in nature.  Medications:  Metformin 500 mg patient states that he is not taking it in the morning related to a side effect, he was noticing dizziness when he takes medication in the morning.  Last A1c was 7.6, today 8.1%.  Labs are ordered for update.  Diabetic foot exam was deferred today.  Not currently on an ACE or ARB.  Patient is taking atorvastatin.  No hypertension  Patient does check his feet nightly.      Patient Active Problem List    Diagnosis Date Noted   • Penile curve 06/08/2021   • Hypertriglyceridemia 08/23/2018   • Type 2 diabetes mellitus without complication, without long-term current use of insulin ([facility]) 12/05/2017   • Left sided sciatica 11/30/2017   • Lesion of throat 11/30/2017       Allergies:Patient has no known allergies.    Current Outpatient Medications   Medication Sig Dispense Refill   • metFORMIN ER 1000 MG TABLET SR 24 HR Take 1  "tablet by mouth with dinner for 90 days. 90 tablet 0   • atorvastatin (LIPITOR) 40 MG Tab Take 1 Tab by mouth every evening. 90 Tab 3     No current facility-administered medications for this visit.       Social History     Tobacco Use   • Smoking status: Former Smoker     Types: Cigarettes     Quit date: 12/15/2020     Years since quittin.4   • Smokeless tobacco: Never Used   Vaping Use   • Vaping Use: Never used   Substance Use Topics   • Alcohol use: Yes   • Drug use: No       Family Status   Relation Name Status   • Mo  Alive   • Fa  Alive     Family History   Problem Relation Age of Onset   • Kidney Disease Mother    • Diabetes Mother    • Hypertension Mother    • Diabetes Father        ROS:   Patient denies headache, blurry vision, dizziness, chest pain, shortness of breath, abdominal pain, nausea, vomiting, change in level of consciousness.  All other systems reviewed and are negative except as in HPI.    Exam:  /72 (BP Location: Left arm, Patient Position: Sitting, BP Cuff Size: Adult)   Pulse 81   Temp 36.8 °C (98.2 °F) (Temporal)   Resp 16   Ht 1.88 m (6' 2\")   Wt 97.9 kg (215 lb 12.8 oz)   SpO2 95%   Constitutional: Alert, no distress, well-groomed.  Skin: Warm, dry, good turgor, no rashes in visible areas.  Eye: Equal, round and reactive, conjunctiva clear, lids normal.  ENMT: Lips without lesions, good dentition, moist mucous membranes.  Neck: Trachea midline, no masses, no thyromegaly.  Respiratory: Unlabored respiratory effort, no cough.  MSK: Normal gait, moves all extremities.  Neuro: Grossly non-focal.   Psych: Alert and oriented x3, normal affect and mood.  : Genitalia: normal uncircumcised penis, scrotal contents normal to inspection and palpation, normal testes palpated bilaterally, no varicocele present, no hernia detected, nonerect penis with moderate curvature to the left, patient reports that curvature is increased with erection.      PLAN:    1. Type 2 diabetes mellitus " without complication, without long-term current use of insulin ([facility])  Hemoglobin A1c today is 8.1%.  Patient reports an excellent diet and as such we did discuss small modifications that he could continue to make.  Patient is also working on increasing his exercise and has lost weight over the last year.  Has not been able to take the Metformin as prescribed today we will attempt changing the medication to 1000 mg of the extended release in the evening with his meal will report any side effects and we will consider further medication changes as needed..  - POCT  A1C  - Comp Metabolic Panel; Future  - Lipid Profile; Future  - CBC WITH DIFFERENTIAL; Future  - metFORMIN ER 1000 MG TABLET SR 24 HR; Take 1 tablet by mouth with dinner for 90 days.  Dispense: 90 tablet; Refill: 0  - Microalbumin Creat Ratio Urine - Lab Collect; Future    2. Penile curve  Patient is referred to urology for further evaluation.  - REFERRAL TO UROLOGY      Return in about 3 months (around 9/8/2021) for DM.    Please note that this dictation was created using voice recognition software. I have made every reasonable attempt to correct obvious errors, but I expect that there are errors of grammar and possibly content that I did not discover before finalizing the note.

## 2021-06-08 NOTE — ASSESSMENT & PLAN NOTE
Chronic in nature.  Medications:  Metformin 500 mg patient states that he is not taking it in the morning related to a side effect, he was noticing dizziness when he takes medication in the morning.  Last A1c was 7.6, today 8.1%.  Labs are ordered for update.  Diabetic foot exam was deferred today.  Not currently on an ACE or ARB.  Patient is taking atorvastatin.  No hypertension  Patient does check his feet nightly.

## 2021-06-08 NOTE — ASSESSMENT & PLAN NOTE
This is a new issue.  Patient states that he has noticed over the last few years a change in the curvature of his penis.  Patient states he noticed initially that it curved slightly to the left.  States that over the last few years the degree of curvature has increased significantly to the point that it is causing his wife discomfort.  Patient denies any pain, change in erections, decrease in morning erections, pain with urination or ejaculation, blood in his urine or semen, swelling in his scrotum, pain or erythema of the scrotum or penis.  States that his only symptom is the change in curvature and he is requesting a referral to urology today for further work-up.  Concern for Peyronie's disease.

## 2021-06-08 NOTE — PROGRESS NOTES
MEDICATION PRIOR AUTHORIZATION NEEDED:    1. Name of Medication: Metformin 1000mg ER    2. Requested By (Name of Pharmacy): [facility]     3. Is insurance on file current? Yes    4. What is the name & phone number of the 3rd party payor? [facility] [employer] [phone]    Sent via [employer]; Docs scanned into Media.

## 2021-09-06 DIAGNOSIS — E11.9 TYPE 2 DIABETES MELLITUS WITHOUT COMPLICATION, WITHOUT LONG-TERM CURRENT USE OF INSULIN (HCC): ICD-10-CM

## 2021-09-07 RX ORDER — METFORMIN HYDROCHLORIDE EXTENDED-RELEASE TABLETS 1000 MG/1
1000 TABLET, FILM COATED, EXTENDED RELEASE ORAL
Qty: 90 TABLET | Refills: 3 | Status: SHIPPED | OUTPATIENT
Start: 2021-09-07 | End: 2021-09-09

## 2021-09-09 ENCOUNTER — OFFICE VISIT (OUTPATIENT)
Dept: MEDICAL GROUP | Facility: PHYSICIAN GROUP | Age: 46
End: 2021-09-09
Payer: COMMERCIAL

## 2021-09-09 ENCOUNTER — HOSPITAL ENCOUNTER (OUTPATIENT)
Dept: LAB | Facility: MEDICAL CENTER | Age: 46
End: 2021-09-09
Attending: NURSE PRACTITIONER
Payer: COMMERCIAL

## 2021-09-09 VITALS
TEMPERATURE: 98.3 F | BODY MASS INDEX: 27.98 KG/M2 | HEIGHT: 74 IN | HEART RATE: 80 BPM | OXYGEN SATURATION: 96 % | SYSTOLIC BLOOD PRESSURE: 126 MMHG | WEIGHT: 218 LBS | DIASTOLIC BLOOD PRESSURE: 68 MMHG | RESPIRATION RATE: 16 BRPM

## 2021-09-09 DIAGNOSIS — E11.9 TYPE 2 DIABETES MELLITUS WITHOUT COMPLICATION, WITHOUT LONG-TERM CURRENT USE OF INSULIN (HCC): ICD-10-CM

## 2021-09-09 PROBLEM — N48.6 INDURATION PENIS PLASTICA: Status: ACTIVE | Noted: 2021-08-09

## 2021-09-09 LAB
ALBUMIN SERPL BCP-MCNC: 4.4 G/DL (ref 3.2–4.9)
ALBUMIN/GLOB SERPL: 1.6 G/DL
ALP SERPL-CCNC: 91 U/L (ref 30–99)
ALT SERPL-CCNC: 46 U/L (ref 2–50)
ANION GAP SERPL CALC-SCNC: 14 MMOL/L (ref 7–16)
AST SERPL-CCNC: 21 U/L (ref 12–45)
BASOPHILS # BLD AUTO: 0.6 % (ref 0–1.8)
BASOPHILS # BLD: 0.05 K/UL (ref 0–0.12)
BILIRUB SERPL-MCNC: 0.5 MG/DL (ref 0.1–1.5)
BUN SERPL-MCNC: 12 MG/DL (ref 8–22)
CALCIUM SERPL-MCNC: 9.5 MG/DL (ref 8.5–10.5)
CHLORIDE SERPL-SCNC: 102 MMOL/L (ref 96–112)
CHOLEST SERPL-MCNC: 263 MG/DL (ref 100–199)
CO2 SERPL-SCNC: 23 MMOL/L (ref 20–33)
CREAT SERPL-MCNC: 0.75 MG/DL (ref 0.5–1.4)
EOSINOPHIL # BLD AUTO: 0.43 K/UL (ref 0–0.51)
EOSINOPHIL NFR BLD: 4.8 % (ref 0–6.9)
ERYTHROCYTE [DISTWIDTH] IN BLOOD BY AUTOMATED COUNT: 41.1 FL (ref 35.9–50)
FASTING STATUS PATIENT QL REPORTED: NORMAL
GLOBULIN SER CALC-MCNC: 2.8 G/DL (ref 1.9–3.5)
GLUCOSE SERPL-MCNC: 166 MG/DL (ref 65–99)
HBA1C MFR BLD: 8.4 % (ref 0–5.6)
HCT VFR BLD AUTO: 43.2 % (ref 42–52)
HDLC SERPL-MCNC: 41 MG/DL
HGB BLD-MCNC: 14.6 G/DL (ref 14–18)
IMM GRANULOCYTES # BLD AUTO: 0.03 K/UL (ref 0–0.11)
IMM GRANULOCYTES NFR BLD AUTO: 0.3 % (ref 0–0.9)
INT CON NEG: NEGATIVE
INT CON POS: POSITIVE
LDLC SERPL CALC-MCNC: ABNORMAL MG/DL
LYMPHOCYTES # BLD AUTO: 3.23 K/UL (ref 1–4.8)
LYMPHOCYTES NFR BLD: 35.8 % (ref 22–41)
MCH RBC QN AUTO: 29.8 PG (ref 27–33)
MCHC RBC AUTO-ENTMCNC: 33.8 G/DL (ref 33.7–35.3)
MCV RBC AUTO: 88.2 FL (ref 81.4–97.8)
MONOCYTES # BLD AUTO: 0.58 K/UL (ref 0–0.85)
MONOCYTES NFR BLD AUTO: 6.4 % (ref 0–13.4)
NEUTROPHILS # BLD AUTO: 4.71 K/UL (ref 1.82–7.42)
NEUTROPHILS NFR BLD: 52.1 % (ref 44–72)
NRBC # BLD AUTO: 0 K/UL
NRBC BLD-RTO: 0 /100 WBC
PLATELET # BLD AUTO: 306 K/UL (ref 164–446)
PMV BLD AUTO: 11.1 FL (ref 9–12.9)
POTASSIUM SERPL-SCNC: 4.7 MMOL/L (ref 3.6–5.5)
PROT SERPL-MCNC: 7.2 G/DL (ref 6–8.2)
RBC # BLD AUTO: 4.9 M/UL (ref 4.7–6.1)
SODIUM SERPL-SCNC: 139 MMOL/L (ref 135–145)
TRIGL SERPL-MCNC: 433 MG/DL (ref 0–149)
WBC # BLD AUTO: 9 K/UL (ref 4.8–10.8)

## 2021-09-09 PROCEDURE — 80061 LIPID PANEL: CPT

## 2021-09-09 PROCEDURE — 99213 OFFICE O/P EST LOW 20 MIN: CPT | Performed by: NURSE PRACTITIONER

## 2021-09-09 PROCEDURE — 83036 HEMOGLOBIN GLYCOSYLATED A1C: CPT | Performed by: NURSE PRACTITIONER

## 2021-09-09 PROCEDURE — 85025 COMPLETE CBC W/AUTO DIFF WBC: CPT

## 2021-09-09 PROCEDURE — 80053 COMPREHEN METABOLIC PANEL: CPT

## 2021-09-09 PROCEDURE — 82043 UR ALBUMIN QUANTITATIVE: CPT

## 2021-09-09 PROCEDURE — 82570 ASSAY OF URINE CREATININE: CPT

## 2021-09-09 PROCEDURE — 36415 COLL VENOUS BLD VENIPUNCTURE: CPT

## 2021-09-09 RX ORDER — LISINOPRIL 2.5 MG/1
2.5 TABLET ORAL DAILY
Qty: 90 TABLET | Refills: 0 | Status: SHIPPED | OUTPATIENT
Start: 2021-09-09 | End: 2021-12-08

## 2021-09-09 RX ORDER — ATORVASTATIN CALCIUM 40 MG/1
40 TABLET, FILM COATED ORAL EVERY EVENING
Qty: 90 TABLET | Refills: 3 | Status: SHIPPED | OUTPATIENT
Start: 2021-09-09 | End: 2021-09-14

## 2021-09-09 NOTE — PROGRESS NOTES
Chief Complaint   Patient presents with   • Diabetes Follow-up   • Referral Needed     opthalmologist, or optomitrist        HISTORY OF PRESENT ILLNESS: Patient is a 45 y.o. male established patient who presents today to discuss a diabetes.     Type 2 diabetes mellitus without complication, without long-term current use of insulin (CMS-HCC) ([facility])  Chronic in nature.   Medications: Patient taking extended release Metformin 1000 mg daily.  A1c: 8.4% today  Monofilament testing with a 10 gram force: sensation intact: intact bilaterally  Visual Inspection: Feet with maceration, ulcers, fissures.  Pedal pulses: intact bilaterally   ACE added today.  Recommended he restart atorvastatin.   The patient regarding diet, on 24-hour recall patient mentions that his main source of sugar is fruit.  We discussed options for pairing fruit with protein and decreasing overall serving sizes.        Patient Active Problem List    Diagnosis Date Noted   • Induration penis plastica 2021   • Penile curve 2021   • Hypertriglyceridemia 2018   • Type 2 diabetes mellitus without complication, without long-term current use of insulin ([facility]) 2017   • Left sided sciatica 2017   • Lesion of throat 2017       Allergies:Patient has no known allergies.    Current Outpatient Medications   Medication Sig Dispense Refill   • metformin (GLUCOPHAGE) 1000 MG tablet Take 1 Tablet by mouth 2 times a day with meals for 180 days. 360 Tablet 3   • atorvastatin (LIPITOR) 40 MG Tab Take 1 Tablet by mouth every evening. 90 Tablet 3   • lisinopril (PRINIVIL) 2.5 MG Tab Take 1 Tablet by mouth every day for 90 days. 90 Tablet 0     No current facility-administered medications for this visit.       Social History     Tobacco Use   • Smoking status: Former Smoker     Types: Cigarettes     Quit date: 12/15/2020     Years since quittin.7   • Smokeless tobacco: Never Used   Vaping Use   • Vaping Use: Never used   Substance Use  "Topics   • Alcohol use: Yes   • Drug use: No       Family Status   Relation Name Status   • Mo  Alive   • Fa  Alive     Family History   Problem Relation Age of Onset   • Kidney Disease Mother    • Diabetes Mother    • Hypertension Mother    • Diabetes Father        ROS:   Patient denies headache, blurry vision, dizziness, chest pain, shortness of breath, abdominal pain, nausea, vomiting, change in level of consciousness.  All other systems reviewed and are negative except as in HPI.    Exam:  /68 (BP Location: Left arm, Patient Position: Sitting, BP Cuff Size: Large adult)   Pulse 80   Temp 36.8 °C (98.3 °F) (Temporal)   Resp 16   Ht 1.88 m (6' 2\")   Wt 98.9 kg (218 lb)   SpO2 96%   Constitutional: Alert, no distress, well-groomed.  Skin: Warm, dry, good turgor, no rashes in visible areas.  Eye: Equal, round and reactive, conjunctiva clear, lids normal.  ENMT: Lips without lesions, good dentition, moist mucous membranes.  Neck: Trachea midline, no masses, no thyromegaly.  Respiratory: Unlabored respiratory effort, no cough.  MSK: Normal gait, moves all extremities.  Neuro: Grossly non-focal.   Psych: Alert and oriented x3, normal affect and mood.      PLAN:    1. Type 2 diabetes mellitus without complication, without long-term current use of insulin (HCC)  Considering increase in A1c the plan is to increase Metformin to 1000 mg twice daily.  We did discuss that I would recommend considering another medication such as Ozempic patient is provided information on Ozempic today.  Also discussed the importance of continuing his atorvastatin.   - metformin (GLUCOPHAGE) 1000 MG tablet; Take 1 Tablet by mouth 2 times a day with meals for 180 days.  Dispense: 360 Tablet; Refill: 3  - atorvastatin (LIPITOR) 40 MG Tab; Take 1 Tablet by mouth every evening.  Dispense: 90 Tablet; Refill: 3  - lisinopril (PRINIVIL) 2.5 MG Tab; Take 1 Tablet by mouth every day for 90 days.  Dispense: 90 Tablet; Refill: 0  - Diabetic " Monofilament Lower Extremity Exam  - REFERRAL TO OPHTHALMOLOGY  - POCT  A1C      Return in about 3 months (around 12/9/2021), or if symptoms worsen or fail to improve.    Please note that this dictation was created using voice recognition software. I have made every reasonable attempt to correct obvious errors, but I expect that there are errors of grammar and possibly content that I did not discover before finalizing the note.    I have placed the below orders and discussed them with an approved delegating provider. The MA is performing the below orders under the direction of Juan Jose Hernandez.

## 2021-09-09 NOTE — ASSESSMENT & PLAN NOTE
Chronic in nature.   Medications: Patient taking extended release Metformin 1000 mg daily.  A1c: 8.4% today  Monofilament testing with a 10 gram force: sensation intact: intact bilaterally  Visual Inspection: Feet with maceration, ulcers, fissures.  Pedal pulses: intact bilaterally   ACE added today.  Recommended he restart atorvastatin.   The patient regarding diet, on 24-hour recall patient mentions that his main source of sugar is fruit.  We discussed options for pairing fruit with protein and decreasing overall serving sizes.

## 2021-09-10 LAB
CREAT UR-MCNC: 73.5 MG/DL
MICROALBUMIN UR-MCNC: <1.2 MG/DL
MICROALBUMIN/CREAT UR: NORMAL MG/G (ref 0–30)

## 2021-09-14 ENCOUNTER — TELEPHONE (OUTPATIENT)
Dept: MEDICAL GROUP | Facility: PHYSICIAN GROUP | Age: 46
End: 2021-09-14

## 2021-09-14 RX ORDER — ATORVASTATIN CALCIUM 80 MG/1
80 TABLET, FILM COATED ORAL DAILY
Qty: 90 TABLET | Refills: 3 | Status: SHIPPED | OUTPATIENT
Start: 2021-09-14 | End: 2021-12-09

## 2021-09-14 NOTE — TELEPHONE ENCOUNTER
Phone Number Called: [phone] (home)     Call outcome: Spoke to patient regarding message below.    Message: Pt informed, Pt asked if you want to take two of the current dose of Atorvastatin, or do you want to send in a higher dose of the medication for him to take.  Please advise.

## 2021-09-14 NOTE — TELEPHONE ENCOUNTER
----- Message from CLAUDE Enriquez sent at 9/14/2021  7:23 AM PDT -----  Please call pt and give lab results:   -Microalbumin creatinine ratio is within normal limits, this is excellent with regard to your kidneys  -Total cholesterol has increased to 263 and your triglycerides are significantly elevated at 433, as such they were unable to calculate your LDL.  To improve this I would recommend making significant changes to fatty and fried as well as processed foods in your diet.  I would recommend increasing the dose of the statin medication.   -Glucose is elevated at 166.    Please keep your follow-up in December.

## 2021-09-14 NOTE — TELEPHONE ENCOUNTER
Since Gianluca is amenable to the change I sent atorvastatin 80 mg daily.  This is double his current dose so he can take 2 of the 40 mg tablets until he runs out.

## 2021-12-09 ENCOUNTER — OFFICE VISIT (OUTPATIENT)
Dept: MEDICAL GROUP | Facility: PHYSICIAN GROUP | Age: 46
End: 2021-12-09
Payer: COMMERCIAL

## 2021-12-09 VITALS
HEART RATE: 76 BPM | BODY MASS INDEX: 27.18 KG/M2 | TEMPERATURE: 97.4 F | WEIGHT: 211.8 LBS | DIASTOLIC BLOOD PRESSURE: 70 MMHG | HEIGHT: 74 IN | SYSTOLIC BLOOD PRESSURE: 96 MMHG | OXYGEN SATURATION: 96 % | RESPIRATION RATE: 16 BRPM

## 2021-12-09 DIAGNOSIS — Q55.61 PENILE CURVE: ICD-10-CM

## 2021-12-09 DIAGNOSIS — E11.9 TYPE 2 DIABETES MELLITUS WITHOUT COMPLICATION, WITHOUT LONG-TERM CURRENT USE OF INSULIN (HCC): ICD-10-CM

## 2021-12-09 DIAGNOSIS — M54.32 LEFT SIDED SCIATICA: ICD-10-CM

## 2021-12-09 PROBLEM — J39.2 LESION OF THROAT: Status: RESOLVED | Noted: 2017-11-30 | Resolved: 2021-12-09

## 2021-12-09 LAB
HBA1C MFR BLD: 7 % (ref 0–5.6)
INT CON NEG: NEGATIVE
INT CON POS: POSITIVE

## 2021-12-09 PROCEDURE — 83036 HEMOGLOBIN GLYCOSYLATED A1C: CPT | Performed by: NURSE PRACTITIONER

## 2021-12-09 PROCEDURE — 99213 OFFICE O/P EST LOW 20 MIN: CPT | Performed by: NURSE PRACTITIONER

## 2021-12-09 RX ORDER — ATORVASTATIN CALCIUM 40 MG/1
80 TABLET, FILM COATED ORAL DAILY
COMMUNITY
Start: 2021-12-09

## 2021-12-09 RX ORDER — METFORMIN HYDROCHLORIDE EXTENDED-RELEASE TABLETS 1000 MG/1
TABLET, FILM COATED, EXTENDED RELEASE ORAL
COMMUNITY
End: 2021-12-09

## 2021-12-09 RX ORDER — COLLAGENASE CLOSTRIDIUM HISTOLYTICUM 0.9 MG
KIT INJECTION
COMMUNITY
Start: 2021-11-08 | End: 2021-12-09

## 2021-12-09 RX ORDER — LISINOPRIL 2.5 MG/1
TABLET ORAL
COMMUNITY
End: 2021-12-09

## 2021-12-09 ASSESSMENT — FIBROSIS 4 INDEX: FIB4 SCORE: 0.48

## 2021-12-09 NOTE — PROGRESS NOTES
Subjective:     Chief Complaint   Patient presents with   • Diabetes Follow-up         HPI:   Gianluca presents today with     Problem   Penile Curve    Patient reports that he does have an appointment coming up with urology.     Type 2 diabetes mellitus without complication, without long-term current use of insulin (HCC)    This is a chronic condition.  Current medications: metformin 1000 mg PO BID  Last A1c: 8.4, today 7.0  Last Microalb/Cr ratio: WNL  Fasting sugars: states highest 160, states average 130-140  Last diabetic foot exam: completed 9/2021  Neuropathy s/s: denies  Last retinal eye exam: states completed.   ACEi/ARB? Stop lisinopril BP is 96/70, some dizziness.  Statin? Atorvastatin 40 mg  Concomitant HTN? None  Nightly foot checks? Yes  S/S with low bs? Yes, no symptoms.       Left Sided Sciatica    Chronic in nature.  States has been progressively worse over time. States mostly not severe enough to take medication, is concerned as in 2017 he was unable to walk due to severity states he is concerned that it may get worse again.  Patient was mention some changes that were noted on x-ray and states he is interested in following up on this.  Patient is requesting a specialist referral.  Patient states that pain is currently L4-L5, toward the left side, states he does not have any radiation of the pain down his leg today.     Lesion of Throat (Resolved)       Current Outpatient Medications Ordered in Epic   Medication Sig Dispense Refill   • atorvastatin (LIPITOR) 40 MG Tab      • metformin (GLUCOPHAGE) 1000 MG tablet Take 1 Tablet by mouth 2 times a day with meals for 180 days. 360 Tablet 3     No current Epic-ordered facility-administered medications on file.       Health Maintenance: Completed    ROS:  Gen: no fevers/chills, no changes in weight  Eyes: no changes in vision  ENT: no sore throat, no hearing loss, no bloody nose  Pulm: no sob, no cough  CV: no chest pain, no palpitations  GI: no  "nausea/vomiting, no diarrhea  : no dysuria  MSk: no myalgias  Skin: no rash  Neuro: no headaches, no numbness/tingling  Heme/Lymph: no easy bruising      Objective:     Exam:  BP (!) 96/70 (BP Location: Left arm, Patient Position: Sitting, BP Cuff Size: Adult)   Pulse 76   Temp 36.3 °C (97.4 °F) (Temporal)   Resp 16   Ht 1.88 m (6' 2\")   Wt 96.1 kg (211 lb 12.8 oz)   SpO2 96%   BMI 27.19 kg/m²  Body mass index is 27.19 kg/m².    Gen: Alert and oriented, No apparent distress.  Neck: Neck is supple without lymphadenopathy.  Lungs: Normal effort, CTA bilaterally, no wheezes, rhonchi, or rales  CV: Regular rate and rhythm. No murmurs, rubs, or gallops.  Ext: No clubbing, cyanosis, edema.  SPINE: Full ROM intact. Mild to moderate tenderness in paraspinous muscles lumbar spine without current spasm. SLT negative. DTR 2+ patella, 1+ achilles bilaterally. Strength 5/5 proximal and distal LE. Sensation intact bilaterally in LE.      Assessment & Plan:     46 y.o. male with the following -     Problem List Items Addressed This Visit     Left sided sciatica     -Referral placed to orthopedics at patient request  -Continue conservative measures including ice, heat, ibuprofen as needed  -Patient states that pain is worse with frequent bending and stooping, states walking and sitting are not as bothersome.         Relevant Orders    Referral to Orthopedics    Penile curve     -Continue follow-up with urology         Type 2 diabetes mellitus without complication, without long-term current use of insulin (HCC)     -Continue Metformin 1000 mg p.o. twice daily.  -Patient has been doing very well with lifestyle modifications as such we will continue healthy diet and exercise.  -Follow-up in 3 months to ensure maintenance of 7 A1c.         Relevant Orders    POCT  A1C (Completed)            Return in about 3 months (around 3/9/2022), or if symptoms worsen or fail to improve.    Please note that this dictation was created using " voice recognition software. I have made every reasonable attempt to correct obvious errors, but I expect that there are errors of grammar and possibly content that I did not discover before finalizing the note.

## 2021-12-09 NOTE — ASSESSMENT & PLAN NOTE
-Continue Metformin 1000 mg p.o. twice daily.  -Patient has been doing very well with lifestyle modifications as such we will continue healthy diet and exercise.  -Follow-up in 3 months to ensure maintenance of 7 A1c.

## 2021-12-09 NOTE — ASSESSMENT & PLAN NOTE
-Referral placed to orthopedics at patient request  -Continue conservative measures including ice, heat, ibuprofen as needed  -Patient states that pain is worse with frequent bending and stooping, states walking and sitting are not as bothersome.

## 2022-05-14 NOTE — PROGRESS NOTES
Chief Complaint   Patient presents with   • Results     Labs    • Ear Fullness     x 3 days; yesterday some blood when cleaning; px 3        HISTORY OF THE PRESENT ILLNESS: This is a 43 y.o. male established patient who presents today for follow up.    Right Ear Pain  The patient complains of a painful fullness in his right ear. Last night he tried to clean it out with a Q-tip, and saw wax on the end. Since then he feels like he can feel something moving in there. He took ibuprofen for pain, with mild alleviation. He notes a recent vacation to [facility], where he went diving.     Diabetes Mellitus  The patient has a chronic history of of diabetes, and is taking metformin 500 mg twice daily. He has been recorded his blood glucose levels at home, and they have been running between 120-140. However, suddenly it spiked to 220 without a change in his diet. He denies eating excessive desserts. For breakfast he eats eggs and tortillas, for lunch he eats meat and vegetables. He does not drink soda, however for the last few months he has been drinking a few beers nightly. Blood work was completed prior to today's appointment. He is requesting a refill on his metformin.     The patient states his mother was sick for the past few months, and passed away in May. The stress and amount of travel made it difficult for him to eat well. He took a vacation to [facility] to help his stress, and noticed the spike in his blood glucose at that time. He denies eating excessive desserts while there, but he was drinking alcohol and not exercising.     Hypertriglyceridemia  The patient has a chronic history of hyperlipidemia, and takes atorvastatin 40 mg daily without side effects. Blood work was completed prior to today's appointment.         History reviewed. No pertinent past medical history.    History reviewed. No pertinent surgical history.    Family Status   Relation Status   • Mo Alive   • Fa Alive     Family History   Problem Relation Age  of Onset   • Kidney Disease Mother    • Diabetes Mother    • Hypertension Mother    • Diabetes Father        Social History   Substance Use Topics   • Smoking status: Light Tobacco Smoker     Types: Cigarettes   • Smokeless tobacco: Never Used      Comment: Patient is smoking 1-2x/week   • Alcohol use Yes       Allergies: Patient has no known allergies.    Current Outpatient Prescriptions Ordered in [facility]   Medication Sig Dispense Refill   • ciprofloxacin (CIPRO HC) 0.2-1 % Suspension Place 3 Drops in left ear 2 times a day for 7 days. Administer drops to both ears. 1 Bottle 0   • metFORMIN (GLUCOPHAGE) 500 MG Tab Take 1 Tab by mouth 2 times a day, with meals. 180 Tab 3   • atorvastatin (LIPITOR) 40 MG Tab Take 1 Tab by mouth every evening. (Patient not taking: Reported on 6/24/2019) 90 Tab 3   • Blood Glucose Monitoring Suppl Device Meter: Dispense Device of Insurance Preference. Sig. Use as directed for blood sugar monitoring. #1. NR. 1 Device 0   • Blood Glucose Monitoring Suppl Supplies Misc Test strips order: Test strips for covered by insurance meter. Sig: use once daily and prn ssx high or low sugar #100 RF x 3 100 Strip 3     No current Epic-ordered facility-administered medications on file.        Review of Systems   Constitutional: Negative for fever, chills, weight loss and malaise/fatigue.   HENT: Positive for right ear pain. Negative for nosebleeds, congestion, sore throat and neck pain.    Eyes: Negative for blurred vision.   Respiratory:  Negative for cough, sputum production, shortness of breath and wheezing.   Cardiovascular: Negative for chest pain, palpitations, orthopnea and leg swelling.   Gastrointestinal: Negative for heartburn, nausea, vomiting and abdominal pain.   Genitourinary: Negative for dysuria, urgency and frequency.   Musculoskeletal: Negative for myalgias, back pain and joint pain.   Skin: Negative for rash and itching.   Neurological: Negative for dizziness, tingling, tremors,  "sensory change, focal weakness and headaches.   Endo/Heme/Allergies: Does not bruise/bleed easily.   Psychiatric/Behavioral: Negative for depression, anxiety, or memory loss.     All other systems reviewed and are negative except as in HPI.    Exam: /82 (BP Location: Left arm, Patient Position: Sitting, BP Cuff Size: Adult)   Pulse 63   Temp 36.7 °C (98.1 °F) (Temporal)   Resp 16   Ht 1.905 m (6' 3\")   Wt 99.3 kg (219 lb)   SpO2 97%   General:  Normal appearing. No distress.  HEENT: Right tympanic membrane +erythema, pus behind the eardrum, inflammation of canal. Normocephalic. Eyes conjunctiva clear lids without ptosis, pupils equal and reactive to light accommodation, ears normal shape and contour, canals are clear bilaterally, nasal mucosa benign, oropharynx is without erythema, edema or exudates.   Pulmonary:  Clear to ausculation.  Normal effort. No rales, ronchi, or wheezing.  Cardiovascular:  Regular rate and rhythm without murmur. Carotid and radial pulses are intact and equal bilaterally.  Neurologic:  Grossly nonfocal  Lymph:  No cervical, supraclavicular or axillary lymph nodes are palpable  Skin:  Warm and dry.  No obvious lesions.  Musculoskeletal:  Normal gait. No extremity cyanosis, clubbing, or edema.  Psych:  Normal mood and affect. Alert and oriented x3. Judgment and insight is normal.    Labs:  Orders Only on 06/21/2019   Component Date Value Ref Range Status   • Cholesterol,Tot 06/21/2019 213* 100 - 199 mg/dL Final   • Triglycerides 06/21/2019 207* 0 - 149 mg/dL Final   • HDL 06/21/2019 49  >39 mg/dL Final   • VLDL Cholesterol Calc 06/21/2019 41* 5 - 40 mg/dL Final   • LDL 06/21/2019 123* 0 - 99 mg/dL Final   • Comment: 06/21/2019 CANCELED   Final    Result canceled by the ancillary   • Glycohemoglobin 06/21/2019 8.9* 4.8 - 5.6 % Final    Comment: Prediabetes: 5.7 - 6.4  Diabetes: >6.4  Glycemic control for adults with diabetes: <7.0         PLAN:    1. Type 2 diabetes mellitus " without complication, without long-term current use of insulin (HCC)  Continue current medications, recheck in 3 months.  Patient states that he will work on diet and exercise states that with vacation and the death of his mom he has been drinking more alcohol recently and states that he plans to cut down on this consumption.    2. Hypertriglyceridemia  Patient's triglycerides have improved from 285 to 207. Continue taking atorvastatin 40 mg daily.     3. Acute diffuse otitis externa of right ear  I am concerned for pseudomonas. I have prescribed Cipro ear drops, and instructed him to use 3 drops every morning and evening, and to lay on his side for a few minutes to let the medicine sink in. If his pain does not improve in 7 days he will contact me.   - ciprofloxacin (CIPRO HC) 0.2-1 % Suspension; Place 3 Drops in left ear 2 times a day for 7 days. Administer drops to both ears.  Dispense: 1 Bottle; Refill: 0    4. Uncontrolled type 2 diabetes mellitus without complication, without long-term current use of insulin (HCC)  Patient's A1c is elevated at 8.9. He is still taking his metformin 500 mg twice daily. I believe the increase is due to his nightly alcohol consumption. I instructed him to stop drinking alcohol, and to continue exercising regularly. Continue taking metformin 500 mg twice daily. I have refilled his prescription.   - metFORMIN (GLUCOPHAGE) 500 MG Tab; Take 1 Tab by mouth 2 times a day, with meals.  Dispense: 180 Tab; Refill: 3     Follow-up in 3 months or sooner. Patient is encouraged to be seen in the emergency room for chest pain, palpitations, shortness of breath, dizziness, severe abdominal pain or other concerning symptoms.    Please note that this dictation was created using voice recognition software. I have made every reasonable attempt to correct obvious errors, but I expect that there are errors of grammar and possibly content that I did not discover before finalizing the  note.      Assessment/Plan  1. Type 2 diabetes mellitus without complication, without long-term current use of insulin (HCC)     2. Hypertriglyceridemia     3. Acute diffuse otitis externa of right ear  ciprofloxacin (CIPRO HC) 0.2-1 % Suspension   4. Uncontrolled type 2 diabetes mellitus without complication, without long-term current use of insulin (HCC)  metFORMIN (GLUCOPHAGE) 500 MG Tab         I have placed the below orders and discussed them with an approved delegating provider. The MA is performing the below orders under the direction of Dr. Richardson.       Jeaneth VILLALTA (Scribe), am scribing for, and in the presence of, BRADY Heath    Electronically signed by: Jeaneth Boss (Martínez), 6/24/2019    Eliu VILLALTA APRN personally performed the services described in this documentation, as scribed by Jeaneth Boss in my presence, and it is both accurate and complete.      Normal vision: sees adequately in most situations; can see medication labels, newsprint

## 2022-06-12 ENCOUNTER — HOSPITAL ENCOUNTER (EMERGENCY)
Facility: MEDICAL CENTER | Age: 47
End: 2022-06-12
Attending: EMERGENCY MEDICINE
Payer: COMMERCIAL

## 2022-06-12 VITALS
HEART RATE: 68 BPM | WEIGHT: 210.98 LBS | HEIGHT: 74 IN | SYSTOLIC BLOOD PRESSURE: 137 MMHG | BODY MASS INDEX: 27.08 KG/M2 | DIASTOLIC BLOOD PRESSURE: 91 MMHG | TEMPERATURE: 98.2 F | RESPIRATION RATE: 18 BRPM | OXYGEN SATURATION: 96 %

## 2022-06-12 VITALS
RESPIRATION RATE: 16 BRPM | DIASTOLIC BLOOD PRESSURE: 86 MMHG | BODY MASS INDEX: 26.77 KG/M2 | SYSTOLIC BLOOD PRESSURE: 121 MMHG | TEMPERATURE: 97.6 F | WEIGHT: 208.56 LBS | OXYGEN SATURATION: 99 % | HEIGHT: 74 IN | HEART RATE: 71 BPM

## 2022-06-12 DIAGNOSIS — S61.511A LACERATION OF RIGHT WRIST, INITIAL ENCOUNTER: ICD-10-CM

## 2022-06-12 PROCEDURE — 99283 EMERGENCY DEPT VISIT LOW MDM: CPT

## 2022-06-12 PROCEDURE — 29125 APPL SHORT ARM SPLINT STATIC: CPT

## 2022-06-12 PROCEDURE — 90715 TDAP VACCINE 7 YRS/> IM: CPT | Performed by: EMERGENCY MEDICINE

## 2022-06-12 PROCEDURE — 302874 HCHG BANDAGE ACE 2 OR 3""

## 2022-06-12 PROCEDURE — 303747 HCHG EXTRA SUTURE

## 2022-06-12 PROCEDURE — 304999 HCHG REPAIR-SIMPLE/INTERMED LEVEL 1

## 2022-06-12 PROCEDURE — 90471 IMMUNIZATION ADMIN: CPT

## 2022-06-12 PROCEDURE — 700111 HCHG RX REV CODE 636 W/ 250 OVERRIDE (IP): Performed by: EMERGENCY MEDICINE

## 2022-06-12 PROCEDURE — 302875 HCHG BANDAGE ACE 4 OR 6""

## 2022-06-12 RX ORDER — CEPHALEXIN 500 MG/1
500 CAPSULE ORAL 4 TIMES DAILY
Qty: 20 CAPSULE | Refills: 0 | Status: SHIPPED | OUTPATIENT
Start: 2022-06-12 | End: 2022-06-17

## 2022-06-12 RX ADMIN — CLOSTRIDIUM TETANI TOXOID ANTIGEN (FORMALDEHYDE INACTIVATED), CORYNEBACTERIUM DIPHTHERIAE TOXOID ANTIGEN (FORMALDEHYDE INACTIVATED), BORDETELLA PERTUSSIS TOXOID ANTIGEN (GLUTARALDEHYDE INACTIVATED), BORDETELLA PERTUSSIS FILAMENTOUS HEMAGGLUTININ ANTIGEN (FORMALDEHYDE INACTIVATED), BORDETELLA PERTUSSIS PERTACTIN ANTIGEN, AND BORDETELLA PERTUSSIS FIMBRIAE 2/3 ANTIGEN 0.5 ML: 5; 2; 2.5; 5; 3; 5 INJECTION, SUSPENSION INTRAMUSCULAR at 17:58

## 2022-06-12 ASSESSMENT — FIBROSIS 4 INDEX
FIB4 SCORE: 0.48
FIB4 SCORE: 0.48

## 2022-06-13 NOTE — ED PROVIDER NOTES
"CHIEF COMPLAINT  Chief Complaint   Patient presents with   • Laceration     Rt wrist with knife       HPI  Gianluca Jasso is a 46 y.o. male who presents with a laceration to the ulnar aspect of his right volar wrist.  He accidentally stabbed himself with a new knife-it was clean.  No pulsatile bleeding.  No numbness or weakness in his hand.  Nothing makes his symptoms better or worse.  Patient does have a history of diabetes.    REVIEW OF SYSTEMS  No paresthesias, no weakness, no.  Bleeding    PAST MEDICAL HISTORY  Past Medical History:   Diagnosis Date   • Diabetes mellitus (HCC)        FAMILY HISTORY  Family History   Problem Relation Age of Onset   • Kidney Disease Mother    • Diabetes Mother    • Hypertension Mother    • Diabetes Father        SOCIAL HISTORY  Social History     Socioeconomic History   • Marital status:    Tobacco Use   • Smoking status: Former Smoker     Types: Cigarettes     Quit date: 12/15/2020     Years since quittin.4   • Smokeless tobacco: Never Used   Vaping Use   • Vaping Use: Never used   Substance and Sexual Activity   • Alcohol use: Yes   • Drug use: No   • Sexual activity: Yes     Partners: Female       SURGICAL HISTORY  History reviewed. No pertinent surgical history.    CURRENT MEDICATIONS  Home Medications    **Home medications have not yet been reviewed for this encounter**         ALLERGIES  No Known Allergies    PHYSICAL EXAM  VITAL SIGNS: /74   Pulse 70   Temp 36.8 °C (98.2 °F) (Temporal)   Resp 18   Ht 1.88 m (6' 2\")   Wt 95.7 kg (210 lb 15.7 oz)   SpO2 97%   BMI 27.09 kg/m²      Constitutional: Well developed, Well nourished, No acute distress, Non-toxic appearance.   HENT: Normocephalic, Atraumatic  Cardiovascular: Regular pulse  Lungs: No respiratory distress  Skin: Warm, Dry, no rash  Extremities: Right wrist demonstrates a laceration that measures approximately 3 cm that is cornered, explored to its base in a bloodless field demonstrates no " evidence of arterial injury, no foreign body and no evidence of definite tendon injury, radial and ulnar pulses are 2+, flexion extension of all digits is intact with intact sensation and cap refill  Neurologic: Alert, appropriate, follows commands  Psychiatric: Affect normal    RADIOLOGY/PROCEDURES  Laceration repair-the laceration was anesthetized with 1% lidocaine. After this it was explored to its base in a bloodless field. No foreign body, tendon injury or arterial injury was appreciated. The area was copiously irrigated with saline. After this Betadine prep was used.  4, 4-0 Ethilon sutures were used to approximate the wound. No complications.    COURSE & MEDICAL DECISION MAKING  Pertinent Labs & Imaging studies reviewed. (See chart for details)  This is a 46-year-old male who presents with a laceration to his wrist.  This was irrigated copiously here and then sutured closed.  Patient will be discharged to have the sutures out in about 10 days.  He will be given a wrist splint.    FINAL IMPRESSION  1.  Laceration  2.   3.         Electronically signed by: Jairo Dubose M.D., 6/12/2022 5:45 PM

## 2022-06-13 NOTE — ED TRIAGE NOTES
"46 yr old male to triage via wheel chair  Chief Complaint   Patient presents with   • Bleeding/Bruising     Patient to triage via wheel chair with a family member patient states he was here earlier for a laceration repair to the right elbow and when he got home it starting bleeding \"a lot\"  Patient is not on blood thinners or has any hematology disorders such as Factor IV, etc.       BP (!) 133/90   Pulse 68   Temp 36.4 °C (97.6 °F) (Temporal)   Resp 18   Ht 1.88 m (6' 2\")   Wt 94.6 kg (208 lb 8.9 oz)   SpO2 98%   BMI 26.78 kg/m²     "

## 2022-06-13 NOTE — ED PROVIDER NOTES
"CHIEF COMPLAINT  Chief Complaint   Patient presents with   • Bleeding/Bruising     Patient to triage via wheel chair with a family member patient states he was here earlier for a laceration repair to the right elbow and when he got home it starting bleeding \"a lot\"  Patient is not on blood thinners or has any hematology disorders such as Factor IV, etc.         HPI  Gianluca Jasso is a 46 y.o. male who presents for bleeding from a wound that he had sutured by myself earlier today.  He states it was bleeding \"a lot\".  On arrival in triage his dressing was removed and there was a mild ooze from the wound.  Patient denies blood thinner use.    REVIEW OF SYSTEMS  No pulsatile bleeding from laceration    PAST MEDICAL HISTORY  Past Medical History:   Diagnosis Date   • Diabetes mellitus (HCC)    • Hypercholesteremia        FAMILY HISTORY  Family History   Problem Relation Age of Onset   • Kidney Disease Mother    • Diabetes Mother    • Hypertension Mother    • Diabetes Father        SOCIAL HISTORY  Social History     Socioeconomic History   • Marital status:    Tobacco Use   • Smoking status: Former Smoker     Types: Cigarettes     Quit date: 12/15/2020     Years since quittin.4   • Smokeless tobacco: Never Used   Vaping Use   • Vaping Use: Never used   Substance and Sexual Activity   • Alcohol use: Yes   • Drug use: No   • Sexual activity: Yes     Partners: Female       SURGICAL HISTORY  History reviewed. No pertinent surgical history.    CURRENT MEDICATIONS  Home Medications     Reviewed by Arabella Blum R.N. (Registered Nurse) on 22 at [facility]  Med List Status: Complete   Medication Last Dose Status   atorvastatin (LIPITOR) 40 MG Tab 6/10/2022 Active   cephALEXin (KEFLEX) 500 MG Cap  Active   metformin (GLUCOPHAGE) 1000 MG tablet 2022 Active                ALLERGIES  No Known Allergies    PHYSICAL EXAM  VITAL SIGNS: BP (!) 133/90   Pulse 68   Temp 36.4 °C (97.6 °F) (Temporal)   Resp 18   Ht " "1.88 m (6' 2\")   Wt 94.6 kg (208 lb 8.9 oz)   SpO2 98%   BMI 26.78 kg/m²      Constitutional: Well developed, Well nourished, No acute distress, Non-toxic appearance.   HENT: Normocephalic, Atraumatic  Cardiovascular: Regular pulse  Lungs: No respiratory distress  Skin: Warm, Dry, no rash  Extremities: Minimal oozing from the wrist laceration, distal ulnar pulses intact, radial pulses intact, cap refill is normal in all digits and sensation and motor function intact  Neurologic: Alert, appropriate, follows commands  Psychiatric: Affect normal    RADIOLOGY/PROCEDURES  Pressure dressing was applied to the wrist and a ulnar gutter splint will be placed.    COURSE & MEDICAL DECISION MAKING  Pertinent Labs & Imaging studies reviewed. (See chart for details)  This is a 46-year-old male who presents with a wrist laceration.  There is minimal bleeding at this time from the wound.  A pressure dressing was applied and the patient will be placed in an ulnar gutter splint.  The bleeding is likely from a retained hematoma.    FINAL IMPRESSION  1.  Wound check  2.   3.         Electronically signed by: Jairo Dubose M.D., 6/12/2022 9:22 PM        "

## 2022-06-13 NOTE — ED NOTES
Dressing placed in triage CDI. Pt. States that dressing was bloody after returning home from previous visit.

## 2022-06-13 NOTE — ED TRIAGE NOTES
Pt to er with c/o laceration to inside of right wrist from knife while preparing food in kitchen. Site dressed in triage, pt states last tetanus 4 years ago. Cms appears intact

## 2022-06-21 ENCOUNTER — OFFICE VISIT (OUTPATIENT)
Dept: URGENT CARE | Facility: PHYSICIAN GROUP | Age: 47
End: 2022-06-21
Payer: COMMERCIAL

## 2022-06-21 VITALS
HEIGHT: 74 IN | HEART RATE: 72 BPM | TEMPERATURE: 97.5 F | RESPIRATION RATE: 16 BRPM | BODY MASS INDEX: 27.08 KG/M2 | DIASTOLIC BLOOD PRESSURE: 70 MMHG | WEIGHT: 211 LBS | SYSTOLIC BLOOD PRESSURE: 112 MMHG | OXYGEN SATURATION: 97 %

## 2022-06-21 DIAGNOSIS — Z48.02 VISIT FOR SUTURE REMOVAL: ICD-10-CM

## 2022-06-21 PROCEDURE — 99212 OFFICE O/P EST SF 10 MIN: CPT | Performed by: STUDENT IN AN ORGANIZED HEALTH CARE EDUCATION/TRAINING PROGRAM

## 2022-06-21 ASSESSMENT — ENCOUNTER SYMPTOMS
NAUSEA: 0
CHILLS: 0
FEVER: 0
TINGLING: 0
VOMITING: 0
ABDOMINAL PAIN: 0
DIARRHEA: 0
MYALGIAS: 0
SENSORY CHANGE: 0
HEADACHES: 0

## 2022-06-21 ASSESSMENT — FIBROSIS 4 INDEX: FIB4 SCORE: 0.48

## 2022-06-21 NOTE — PROCEDURES
Suture Removal    Date/Time: 6/21/2022 9:39 AM  Performed by: Lore Mims P.A.-C.  Authorized by: Lore Mims P.A.-C.   Body area: upper extremity  Location details: right wrist  Wound Appearance: clean and pink  Sutures Removed: 4  Post-removal: dressing applied  Patient tolerance: patient tolerated the procedure well with no immediate complications

## 2022-06-21 NOTE — PROGRESS NOTES
"Guillermo Jasso is a 46 y.o. male who presents with Suture / Staple Removal (R wrist, pt got 4 stitches 10 days ago)            Patient presents today for suture removal on her right wrist.  Patient got stitches in [facility] ED 10 days ago.    Suture / Staple Removal  The sutures were placed 7 to 10 days ago. He tried antibiotic ointment use and oral antibiotics since the wound repair. There has been no drainage from the wound. There is no redness present. There is no swelling present. There is no pain present. He has no difficulty moving the affected extremity or digit.       Review of Systems   Constitutional: Negative for chills and fever.   Gastrointestinal: Negative for abdominal pain, diarrhea, nausea and vomiting.   Musculoskeletal: Negative for joint pain and myalgias.   Skin: Negative for itching and rash.   Neurological: Negative for tingling, sensory change and headaches.              Objective     /70 (BP Location: Left arm, Patient Position: Sitting, BP Cuff Size: Adult)   Pulse 72   Temp 36.4 °C (97.5 °F) (Temporal)   Resp 16   Ht 1.88 m (6' 2\")   Wt 95.7 kg (211 lb)   SpO2 97%   BMI 27.09 kg/m²      Physical Exam  Vitals reviewed.   Musculoskeletal:      Right wrist: No swelling, deformity, effusion, tenderness, bony tenderness or snuff box tenderness. Normal range of motion. Normal pulse.      Left wrist: Normal.      Right hand: No swelling, deformity or tenderness. Normal range of motion. Normal strength. Normal sensation. Normal capillary refill. Normal pulse.      Left hand: Normal.      Comments: Healing lacteration of right wrist with 4 sutures. No erythema. No induration. Non-tender to palpation.   Skin:     General: Skin is warm and dry.                             Assessment & Plan        1. Visit for suture removal    Other orders  - metformin (GLUCOPHAGE) 1000 MG tablet; metformin 1,000 mg tablet   TAKE 1 TABLET BY MOUTH 2 TIMES A DAY WITH MEALS  DAYS.     "       Pt educated to protect the wound area over the next several weeks as an injury this area could cause the cut to split open again.   Pt educated that it usually takes 1-2 years for a scar to get its full strength and loose its redness.  Pt educated that topical antibiotics are not usually needed at this point. Applying vitamin E oil and aloe vera ointments may help the wound heal faster and stronger.  Pt educated that some scars form extra pigment with exposure to sunlight during the first 6-12 months after repair and that this can be prevented by using a sun block (SPF 15-30) on the affected area.     Pt educated to follow up with PCP if theyhave any concerns. Pt educated to go to PCP, urgent care or ED if there is any sign of infection, such as increased pain, redness, swelling or fever.    Pt states understanding and agrees with plan of care.

## 2022-09-06 RX ORDER — ATORVASTATIN CALCIUM 80 MG/1
80 TABLET, FILM COATED ORAL DAILY
Qty: 90 TABLET | Refills: 0 | OUTPATIENT
Start: 2022-09-06

## 2022-09-06 NOTE — TELEPHONE ENCOUNTER
Patient's most recent prescription was for a 40 mg dose of atorvastatin please verify with patient whether he is taking 80 or 40 mg.

## 2022-10-25 ENCOUNTER — OFFICE VISIT (OUTPATIENT)
Dept: MEDICAL GROUP | Facility: PHYSICIAN GROUP | Age: 47
End: 2022-10-25
Payer: COMMERCIAL

## 2022-10-25 ENCOUNTER — HOSPITAL ENCOUNTER (OUTPATIENT)
Facility: MEDICAL CENTER | Age: 47
End: 2022-10-25
Attending: NURSE PRACTITIONER
Payer: COMMERCIAL

## 2022-10-25 VITALS
BODY MASS INDEX: 27.85 KG/M2 | OXYGEN SATURATION: 96 % | TEMPERATURE: 97.5 F | SYSTOLIC BLOOD PRESSURE: 120 MMHG | HEART RATE: 79 BPM | WEIGHT: 217 LBS | DIASTOLIC BLOOD PRESSURE: 76 MMHG | HEIGHT: 74 IN

## 2022-10-25 DIAGNOSIS — E11.9 TYPE 2 DIABETES MELLITUS WITHOUT COMPLICATION, WITHOUT LONG-TERM CURRENT USE OF INSULIN (HCC): ICD-10-CM

## 2022-10-25 DIAGNOSIS — S64.01XA INJURY OF RIGHT ULNAR NERVE AT WRIST, INITIAL ENCOUNTER: ICD-10-CM

## 2022-10-25 DIAGNOSIS — R20.2 NUMBNESS AND TINGLING IN RIGHT HAND: ICD-10-CM

## 2022-10-25 DIAGNOSIS — R20.0 NUMBNESS AND TINGLING IN RIGHT HAND: ICD-10-CM

## 2022-10-25 DIAGNOSIS — Z23 NEED FOR VACCINATION: ICD-10-CM

## 2022-10-25 LAB
HBA1C MFR BLD: 8 % (ref 0–5.6)
INT CON NEG: NEGATIVE
INT CON POS: POSITIVE

## 2022-10-25 PROCEDURE — 82043 UR ALBUMIN QUANTITATIVE: CPT

## 2022-10-25 PROCEDURE — 82570 ASSAY OF URINE CREATININE: CPT

## 2022-10-25 PROCEDURE — 90471 IMMUNIZATION ADMIN: CPT | Performed by: NURSE PRACTITIONER

## 2022-10-25 PROCEDURE — 90472 IMMUNIZATION ADMIN EACH ADD: CPT | Performed by: NURSE PRACTITIONER

## 2022-10-25 PROCEDURE — 90686 IIV4 VACC NO PRSV 0.5 ML IM: CPT | Performed by: NURSE PRACTITIONER

## 2022-10-25 PROCEDURE — 90677 PCV20 VACCINE IM: CPT | Performed by: NURSE PRACTITIONER

## 2022-10-25 PROCEDURE — 83036 HEMOGLOBIN GLYCOSYLATED A1C: CPT | Performed by: NURSE PRACTITIONER

## 2022-10-25 PROCEDURE — 99214 OFFICE O/P EST MOD 30 MIN: CPT | Mod: 25 | Performed by: NURSE PRACTITIONER

## 2022-10-25 RX ORDER — ATORVASTATIN CALCIUM 80 MG/1
TABLET, FILM COATED ORAL
COMMUNITY
End: 2022-10-25

## 2022-10-25 ASSESSMENT — ENCOUNTER SYMPTOMS
FEVER: 0
COUGH: 0
CHILLS: 0
PALPITATIONS: 0
DEPRESSION: 0
DIZZINESS: 0
HEARTBURN: 0
SHORTNESS OF BREATH: 0

## 2022-10-25 ASSESSMENT — PATIENT HEALTH QUESTIONNAIRE - PHQ9: CLINICAL INTERPRETATION OF PHQ2 SCORE: 0

## 2022-10-25 ASSESSMENT — FIBROSIS 4 INDEX: FIB4 SCORE: 0.49

## 2022-10-25 NOTE — ASSESSMENT & PLAN NOTE
-continue current medication. Discussed adding glimepiride.  -will start checking sugars regularly to see if change in timing of medication or specific food.   -Work on increasing exercise.

## 2022-10-26 LAB
CREAT UR-MCNC: 154.72 MG/DL
MICROALBUMIN UR-MCNC: <1.2 MG/DL
MICROALBUMIN/CREAT UR: NORMAL MG/G (ref 0–30)
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
## 2023-01-27 ENCOUNTER — HOSPITAL ENCOUNTER (OUTPATIENT)
Dept: LAB | Facility: MEDICAL CENTER | Age: 48
End: 2023-01-27
Attending: NURSE PRACTITIONER
Payer: COMMERCIAL

## 2023-01-27 DIAGNOSIS — E11.9 TYPE 2 DIABETES MELLITUS WITHOUT COMPLICATION, WITHOUT LONG-TERM CURRENT USE OF INSULIN (HCC): ICD-10-CM

## 2023-01-27 LAB
ALBUMIN SERPL BCP-MCNC: 4.4 G/DL (ref 3.2–4.9)
ALBUMIN/GLOB SERPL: 1.7 G/DL
ALP SERPL-CCNC: 89 U/L (ref 30–99)
ALT SERPL-CCNC: 17 U/L (ref 2–50)
ANION GAP SERPL CALC-SCNC: 9 MMOL/L (ref 7–16)
AST SERPL-CCNC: 9 U/L (ref 12–45)
BILIRUB SERPL-MCNC: 0.5 MG/DL (ref 0.1–1.5)
BUN SERPL-MCNC: 15 MG/DL (ref 8–22)
CALCIUM ALBUM COR SERPL-MCNC: 9 MG/DL (ref 8.5–10.5)
CALCIUM SERPL-MCNC: 9.3 MG/DL (ref 8.5–10.5)
CHLORIDE SERPL-SCNC: 101 MMOL/L (ref 96–112)
CHOLEST SERPL-MCNC: 256 MG/DL (ref 100–199)
CO2 SERPL-SCNC: 27 MMOL/L (ref 20–33)
CREAT SERPL-MCNC: 0.78 MG/DL (ref 0.5–1.4)
FASTING STATUS PATIENT QL REPORTED: NORMAL
GFR SERPLBLD CREATININE-BSD FMLA CKD-EPI: 110 ML/MIN/1.73 M 2
GLOBULIN SER CALC-MCNC: 2.6 G/DL (ref 1.9–3.5)
GLUCOSE SERPL-MCNC: 189 MG/DL (ref 65–99)
HDLC SERPL-MCNC: 49 MG/DL
LDLC SERPL CALC-MCNC: 155 MG/DL
POTASSIUM SERPL-SCNC: 4.5 MMOL/L (ref 3.6–5.5)
PROT SERPL-MCNC: 7 G/DL (ref 6–8.2)
SODIUM SERPL-SCNC: 137 MMOL/L (ref 135–145)
TRIGL SERPL-MCNC: 259 MG/DL (ref 0–149)

## 2023-01-27 PROCEDURE — 80053 COMPREHEN METABOLIC PANEL: CPT

## 2023-01-27 PROCEDURE — 36415 COLL VENOUS BLD VENIPUNCTURE: CPT

## 2023-01-27 PROCEDURE — 80061 LIPID PANEL: CPT

## 2023-01-30 ENCOUNTER — OFFICE VISIT (OUTPATIENT)
Dept: MEDICAL GROUP | Facility: PHYSICIAN GROUP | Age: 48
End: 2023-01-30
Payer: COMMERCIAL

## 2023-01-30 VITALS
HEART RATE: 84 BPM | TEMPERATURE: 98.3 F | DIASTOLIC BLOOD PRESSURE: 74 MMHG | HEIGHT: 74 IN | BODY MASS INDEX: 27.98 KG/M2 | OXYGEN SATURATION: 97 % | SYSTOLIC BLOOD PRESSURE: 122 MMHG | WEIGHT: 218 LBS

## 2023-01-30 DIAGNOSIS — E78.1 HYPERTRIGLYCERIDEMIA: ICD-10-CM

## 2023-01-30 DIAGNOSIS — E78.00 HYPERCHOLESTEREMIA: ICD-10-CM

## 2023-01-30 DIAGNOSIS — E11.9 TYPE 2 DIABETES MELLITUS WITHOUT COMPLICATION, WITHOUT LONG-TERM CURRENT USE OF INSULIN (HCC): ICD-10-CM

## 2023-01-30 LAB
HBA1C MFR BLD: 8 % (ref 0–5.6)
INT CON NEG: ABNORMAL
INT CON POS: ABNORMAL

## 2023-01-30 PROCEDURE — 99214 OFFICE O/P EST MOD 30 MIN: CPT | Performed by: NURSE PRACTITIONER

## 2023-01-30 PROCEDURE — 83036 HEMOGLOBIN GLYCOSYLATED A1C: CPT | Performed by: NURSE PRACTITIONER

## 2023-01-30 ASSESSMENT — ENCOUNTER SYMPTOMS
DIARRHEA: 0
DIZZINESS: 0
FEVER: 0
CLAUDICATION: 0
VOMITING: 0
SHORTNESS OF BREATH: 0
TINGLING: 0
ABDOMINAL PAIN: 0
SENSORY CHANGE: 0
COUGH: 0
NAUSEA: 0
BLURRED VISION: 0

## 2023-01-30 ASSESSMENT — FIBROSIS 4 INDEX: FIB4 SCORE: 0.34

## 2023-01-30 ASSESSMENT — PATIENT HEALTH QUESTIONNAIRE - PHQ9: CLINICAL INTERPRETATION OF PHQ2 SCORE: 0

## 2023-01-30 NOTE — ASSESSMENT & PLAN NOTE
- Continue taking atorvastatin   - Continue to exercise regularly and choose healthy food options

## 2023-01-30 NOTE — ASSESSMENT & PLAN NOTE
- Continue to exercise regularly and improve diet - eat less red meat, limit sugar and carbohydrates

## 2023-01-30 NOTE — PROGRESS NOTES
Subjective:     Chief Complaint   Patient presents with    Follow-Up     A1C, Results       HPI:   Gianluca presents today with     Problem   Hypercholesteremia    Chronic in nature. Cholesterol 256, improved from 263. Taking atorvastatin 40 mg, admits to missing doses due to forgetting to take medication. Ways to improve medication compliance explored, set alarms for due time.        Hypertriglyceridemia    Chronic in nature. Triglycerides 259, improved from 433.      Type 2 diabetes mellitus without complication, without long-term current use of insulin (HCC)    This is a chronic condition.  Current medications: metformin 1000 mg PO BID  Last A1c: 8.0 today  Last Microalb/Cr ratio: WNL  Last diabetic foot exam: completed 9/2021  Neuropathy s/s: denies  Last retinal eye exam: 1.5 years ago   ACEi/ARB? Previously discontinued due to symptomatic low BP   Statin? Atorvastatin 40 mg  Concomitant HTN? None  Nightly foot checks? Yes  S/S with low bs? No     Reports consuming red meat often. No breads, rice, some intermittent tortillas. No soda or juice, no processed or packaged foods. He exercises for 1 hour 3 times per week with . 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 Last eye exam with ophthalmologist 1.5 years ago, states he will schedule appointment. Denies polyuria, polydipsia, no numbness or tingling in the BLE. Denies sensation loss. No visual changes or dizziness. Last foot exam 3 months ago. Denies chest pain, no swelling in extremities.  Fruits with protein. States eating vegetables. Reports he often does not take his metformin 1000 mg twice a day as prescribed, 50% of the time he takes this medication only once a day. Medication refilled, states he will take this medication to work with him so he does not forget to take his afternoon dose.          Labs completed 3 days ago were reviewed at this appointment.    Current Outpatient Medications Ordered in Epic   Medication Sig Dispense Refill     "metformin (GLUCOPHAGE) 1000 MG tablet TAKE 1 TABLET BY MOUTH 2 TIMES A DAY WITH MEALS  DAYS. 180 Tablet 3    atorvastatin (LIPITOR) 40 MG Tab Take 80 mg by mouth every day.       No current [facility]-ordered facility-administered medications on file.       Health Maintenance: Address at follow-up appointment     Review of Systems   Constitutional:  Negative for fever.   HENT:  Negative for hearing loss.    Eyes:  Negative for blurred vision.   Respiratory:  Negative for cough and shortness of breath.    Cardiovascular:  Negative for chest pain, claudication and leg swelling.   Gastrointestinal:  Negative for abdominal pain, diarrhea, nausea and vomiting.   Genitourinary:  Negative for frequency.   Neurological:  Negative for dizziness, tingling and sensory change.       Objective:     Exam:  /74 (BP Location: Left arm, Patient Position: Sitting, BP Cuff Size: Adult)   Pulse 84   Temp 36.8 °C (98.3 °F) (Temporal)   Ht 1.88 m (6' 2\")   Wt 98.9 kg (218 lb)   SpO2 97%   BMI 27.99 kg/m²  Body mass index is 27.99 kg/m².    Physical Exam  Constitutional:       Appearance: Normal appearance.   HENT:      Mouth/Throat:      Pharynx: No posterior oropharyngeal erythema.   Eyes:      Pupils: Pupils are equal, round, and reactive to light.   Cardiovascular:      Heart sounds: Normal heart sounds.   Pulmonary:      Effort: Pulmonary effort is normal.      Breath sounds: Normal breath sounds.   Neurological:      Mental Status: He is alert.     Assessment & Plan:     47 y.o. male with the following -     Problem List Items Addressed This Visit       Hypercholesteremia     - Continue taking atorvastatin   - Continue to exercise regularly and choose healthy food options          Hypertriglyceridemia     - Continue to exercise regularly and improve diet - eat less red meat, limit sugar and carbohydrates           Type 2 diabetes mellitus without complication, without long-term current use of insulin (HCC)     - " Maintain food journal and bring with you to next appointment   - Continue metformin 1000 mg twice a day as prescribed, continue atorvastatin    - Enact measures to avoid forgetting doses   - Follow-up in 3 months for A1C check           Relevant Orders    POCT  A1C (Completed)       Return in about 3 months (around 4/30/2023), or if symptoms worsen or fail to improve, for DM.      Please note that this dictation was created using voice recognition software. I have made every reasonable attempt to correct obvious errors, but I expect that there are errors of grammar and possibly content that I did not discover before finalizing the note.

## 2023-01-30 NOTE — ASSESSMENT & PLAN NOTE
- Maintain food journal and bring with you to next appointment   - Continue metformin 1000 mg twice a day as prescribed, continue atorvastatin    - Enact measures to avoid forgetting doses   - Follow-up in 3 months for A1C check

## 2024-06-17 ENCOUNTER — APPOINTMENT (OUTPATIENT)
Dept: MEDICAL GROUP | Facility: PHYSICIAN GROUP | Age: 49
End: 2024-06-17
Payer: COMMERCIAL

## 2024-06-18 ENCOUNTER — HOSPITAL ENCOUNTER (OUTPATIENT)
Facility: MEDICAL CENTER | Age: 49
End: 2024-06-18
Attending: NURSE PRACTITIONER
Payer: COMMERCIAL

## 2024-06-18 ENCOUNTER — OFFICE VISIT (OUTPATIENT)
Dept: MEDICAL GROUP | Facility: PHYSICIAN GROUP | Age: 49
End: 2024-06-18
Payer: COMMERCIAL

## 2024-06-18 VITALS
OXYGEN SATURATION: 98 % | TEMPERATURE: 98.1 F | DIASTOLIC BLOOD PRESSURE: 74 MMHG | SYSTOLIC BLOOD PRESSURE: 128 MMHG | HEIGHT: 74 IN | WEIGHT: 218 LBS | HEART RATE: 78 BPM | BODY MASS INDEX: 27.98 KG/M2

## 2024-06-18 DIAGNOSIS — E11.65 UNCONTROLLED TYPE 2 DIABETES MELLITUS WITH HYPERGLYCEMIA (HCC): ICD-10-CM

## 2024-06-18 DIAGNOSIS — E11.9 TYPE 2 DIABETES MELLITUS WITHOUT COMPLICATION, WITHOUT LONG-TERM CURRENT USE OF INSULIN (HCC): ICD-10-CM

## 2024-06-18 DIAGNOSIS — E78.1 HYPERTRIGLYCERIDEMIA: ICD-10-CM

## 2024-06-18 LAB
HBA1C MFR BLD: 11.5 % (ref ?–5.8)
POCT INT CON NEG: NEGATIVE
POCT INT CON POS: POSITIVE

## 2024-06-18 PROCEDURE — 82570 ASSAY OF URINE CREATININE: CPT

## 2024-06-18 PROCEDURE — 82043 UR ALBUMIN QUANTITATIVE: CPT

## 2024-06-18 PROCEDURE — 3078F DIAST BP <80 MM HG: CPT | Performed by: NURSE PRACTITIONER

## 2024-06-18 PROCEDURE — 99214 OFFICE O/P EST MOD 30 MIN: CPT | Performed by: NURSE PRACTITIONER

## 2024-06-18 PROCEDURE — 83036 HEMOGLOBIN GLYCOSYLATED A1C: CPT | Performed by: NURSE PRACTITIONER

## 2024-06-18 PROCEDURE — 3074F SYST BP LT 130 MM HG: CPT | Performed by: NURSE PRACTITIONER

## 2024-06-18 RX ORDER — LANCETS 30 GAUGE
EACH MISCELLANEOUS
Qty: 100 EACH | Refills: 0 | Status: SHIPPED | OUTPATIENT
Start: 2024-06-18 | End: 2024-06-18

## 2024-06-18 RX ORDER — LISINOPRIL 2.5 MG/1
2.5 TABLET ORAL DAILY
Qty: 90 TABLET | Refills: 3 | Status: SHIPPED | OUTPATIENT
Start: 2024-06-18 | End: 2025-06-13

## 2024-06-18 RX ORDER — ACYCLOVIR 400 MG/1
1 TABLET ORAL
Qty: 4 EACH | Refills: 3 | Status: SHIPPED | OUTPATIENT
Start: 2024-06-18

## 2024-06-18 RX ORDER — ATORVASTATIN CALCIUM 80 MG/1
80 TABLET, FILM COATED ORAL NIGHTLY
Qty: 90 TABLET | Refills: 3 | Status: SHIPPED | OUTPATIENT
Start: 2024-06-18

## 2024-06-18 RX ORDER — GLUCOSAMINE HCL/CHONDROITIN SU 500-400 MG
CAPSULE ORAL
Qty: 100 EACH | Refills: 0 | Status: SHIPPED | OUTPATIENT
Start: 2024-06-18 | End: 2024-06-18

## 2024-06-18 RX ORDER — ACYCLOVIR 400 MG/1
1 TABLET ORAL DAILY
Qty: 1 EACH | Refills: 0 | Status: SHIPPED | OUTPATIENT
Start: 2024-06-18

## 2024-06-18 RX ORDER — ATORVASTATIN CALCIUM 40 MG/1
80 TABLET, FILM COATED ORAL DAILY
Qty: 90 TABLET | Refills: 3 | Status: SHIPPED | OUTPATIENT
Start: 2024-06-18 | End: 2024-06-18

## 2024-06-18 ASSESSMENT — PATIENT HEALTH QUESTIONNAIRE - PHQ9: CLINICAL INTERPRETATION OF PHQ2 SCORE: 0

## 2024-06-18 NOTE — TELEPHONE ENCOUNTER
Received request via: Pharmacy      Pharmacy comment: Alternative Requested:NOT COVERED, PLAESE CHANGE TO 80 MG ONCE A DAY.

## 2024-06-18 NOTE — PROGRESS NOTES
Verbal consent was acquired by the patient to use [email or web address] ambient listening note generation during this visit no outside room    Subjective:     HPI:   Gianluca is a 48 y.o.male established patient who presents today for:    History of Present Illness  The patient is a 48-year-old male patient who uses he/him pronouns here today to follow up and discuss diabetes.    The patient's A1c level today is 11.5. He has been off his metformin 1000 mg, taken orally twice daily, for approximately 3 weeks, as he has not had a clinic visit in over a year. His most recent microalbumin creatinine ratio was within normal limits. Today's diabetic foot examination was within normal limits, and the patient reports no signs or symptoms of neuropathy. A retinal eye exam has been collected today.    The patient has been experiencing elevated blood pressure. His home blood pressure readings range from 140 to 150/90. He is currently on atorvastatin 40 mg, taken orally daily for a statin. He conducts nightly foot checks and reports satisfactory dietary and lifestyle modifications.     has a past medical history of Diabetes mellitus (HCC) and Hypercholesteremia.   has no past surgical history on file.    Family History   Problem Relation Age of Onset    Kidney Disease Mother     Diabetes Mother     Hypertension Mother     Diabetes Father        Social History     Socioeconomic History    Marital status:      Spouse name: Not on file    Number of children: Not on file    Years of education: Not on file    Highest education level: Not on file   Occupational History    Not on file   Tobacco Use    Smoking status: Former     Current packs/day: 0.00     Types: Cigarettes     Quit date: 12/15/2020     Years since quitting: 3.5    Smokeless tobacco: Never   Vaping Use    Vaping status: Never Used   Substance and Sexual Activity    Alcohol use: Yes     Comment: not very often    Drug use: No    Sexual activity: Yes     Partners: Female      "Birth control/protection: Condom   Other Topics Concern    Not on file   Social History Narrative    Not on file     Social Determinants of Health     Financial Resource Strain: Not on file   Food Insecurity: Not on file   Transportation Needs: Not on file   Physical Activity: Not on file   Stress: Not on file   Social Connections: Not on file   Intimate Partner Violence: Not on file   Housing Stability: Not on file       Patient Active Problem List    Diagnosis Date Noted    Hypercholesteremia 01/30/2023    Numbness and tingling in right hand 10/25/2022    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 180 Tablet 3    atorvastatin (LIPITOR) 40 MG Tab Take 2 Tablets by mouth every day. 90 Tablet 3    Empagliflozin (JARDIANCE) 10 MG Tab tablet Take 1 Tablet by mouth every day. 90 Tablet 0    lisinopril (PRINIVIL) 2.5 MG Tab Take 1 Tablet by mouth every day for 360 days. 90 Tablet 3    Continuous Glucose Sensor (DEXCOM G7 SENSOR) Misc 1 Application every 10 days. 4 Each 3    Continuous Glucose  (DEXCOM G7 ) Device 1 Device every day. 1 Each 0     No current [facility]-ordered facility-administered medications on file.     No Known Allergies    Health Maintenance: Will address at follow-up    Objective:     Exam:  /74 (BP Location: Left arm, Patient Position: Sitting, BP Cuff Size: Adult)   Pulse 78   Temp 36.7 °C (98.1 °F) (Temporal)   Ht 1.88 m (6' 2\")   Wt 98.9 kg (218 lb)   SpO2 98%   BMI 27.99 kg/m²  Body mass index is 27.99 kg/m².    Physical Exam  Constitutional:       Appearance: Normal appearance.   HENT:      Head: " Normocephalic and atraumatic.   Cardiovascular:      Rate and Rhythm: Normal rate and regular rhythm.      Pulses: Normal pulses.   Pulmonary:      Effort: Pulmonary effort is normal.      Breath sounds: Normal breath sounds.   Skin:     General: Skin is warm and dry.   Neurological:      General: No focal deficit present.      Mental Status: He is alert and oriented to person, place, and time.             Results  Laboratory Studies  A1c is 11.5. Last microalbumin creatinine ratio was within normal limits.    Assessment & Plan:     1. Type 2 diabetes mellitus without complication, without long-term current use of insulin ([facility])  metformin (GLUCOPHAGE) 1000 MG tablet    atorvastatin (LIPITOR) 40 MG Tab    POCT A1C    POCT Retinal Eye Exam    Microalbumin Creat Ratio Urine - Clinic Collect    Comp Metabolic Panel    Lipid Profile    Diabetic Monofilament Lower Extremity Exam      2. Uncontrolled type 2 diabetes mellitus with hyperglycemia ([facility])  Referral to Diabetic Education    Referral to Pharmacotherapy Service    Vitamin B12 (Annually if taking metformin)    Empagliflozin (JARDIANCE) 10 MG Tab tablet    lisinopril (PRINIVIL) 2.5 MG Tab    Continuous Glucose Sensor (DEXCOM G7 SENSOR) Misc    Continuous Glucose  (DEXCOM G7 ) Device    DISCONTINUED: Blood Glucose Meter Kit    DISCONTINUED: Blood Glucose Test Strips    DISCONTINUED: Lancets    DISCONTINUED: Alcohol Swabs          Assessment & Plan  1. Hypertension.  The patient's lisinopril will be reintroduced at a dosage of 2.5 mg daily.    2. Hypercholesterolemia.  The patient's hypercholesterolemia is chronic and stable as per the report. Updated labs, including a lipid panel, will be ordered.    3. Type 2 diabetes.  The patient will commence Dexcom therapy, contingent upon insurance approval.  If not approved blood glucose monitor and test strips will be provided. The possibility of insurance approval for Dexcom was discussed due to the patient's  diabetes status. The patient will commence Jardiance 10 mg daily, in addition to metformin 1000 mg twice daily, to manage his diabetes. The target for his A1c level is less than 7, however, the current goal is to reduce it from 11.5 to 9. Pharmacotherapy has been provided for follow-up with the pharmacist. Close monitoring of the patient's blood sugar levels will be conducted. Vitamin B12 level, CMP, and lipid panel have been ordered.    Follow-up  The patient is scheduled for a follow-up visit in 1 month for diabetes management.    I have placed the below orders and discussed them with an approved delegating provider. The MA is performing the below orders under the direction of Emily Mcdonald PA-C.      Return in about 1 month (around 7/18/2024) for DM.    Please note that this dictation was created using voice recognition software. I have made every reasonable attempt to correct obvious errors, but I expect that there are errors of grammar and possibly content that I did not discover before finalizing the note.

## 2024-06-19 LAB
CREAT UR-MCNC: 51.26 MG/DL
MICROALBUMIN UR-MCNC: 1.6 MG/DL
MICROALBUMIN/CREAT UR: 31 MG/G (ref 0–30)

## 2024-06-21 ENCOUNTER — TELEPHONE (OUTPATIENT)
Dept: HEALTH INFORMATION MANAGEMENT | Facility: OTHER | Age: 49
End: 2024-06-21
Payer: COMMERCIAL

## 2024-06-24 ENCOUNTER — HOSPITAL ENCOUNTER (OUTPATIENT)
Dept: LAB | Facility: MEDICAL CENTER | Age: 49
End: 2024-06-24
Attending: NURSE PRACTITIONER
Payer: COMMERCIAL

## 2024-06-24 ENCOUNTER — OFFICE VISIT (OUTPATIENT)
Dept: MEDICAL GROUP | Facility: PHYSICIAN GROUP | Age: 49
End: 2024-06-24
Payer: COMMERCIAL

## 2024-06-24 VITALS
HEART RATE: 75 BPM | WEIGHT: 218 LBS | SYSTOLIC BLOOD PRESSURE: 128 MMHG | HEIGHT: 74 IN | BODY MASS INDEX: 27.98 KG/M2 | OXYGEN SATURATION: 97 % | DIASTOLIC BLOOD PRESSURE: 74 MMHG

## 2024-06-24 DIAGNOSIS — E78.1 HYPERTRIGLYCERIDEMIA: ICD-10-CM

## 2024-06-24 DIAGNOSIS — E11.65 UNCONTROLLED TYPE 2 DIABETES MELLITUS WITH HYPERGLYCEMIA (HCC): ICD-10-CM

## 2024-06-24 DIAGNOSIS — E78.00 HYPERCHOLESTEREMIA: ICD-10-CM

## 2024-06-24 LAB
ALBUMIN SERPL BCP-MCNC: 4.5 G/DL (ref 3.2–4.9)
ALBUMIN/GLOB SERPL: 1.8 G/DL
ALP SERPL-CCNC: 108 U/L (ref 30–99)
ALT SERPL-CCNC: 49 U/L (ref 2–50)
ANION GAP SERPL CALC-SCNC: 11 MMOL/L (ref 7–16)
AST SERPL-CCNC: 29 U/L (ref 12–45)
BILIRUB SERPL-MCNC: 0.3 MG/DL (ref 0.1–1.5)
BUN SERPL-MCNC: 14 MG/DL (ref 8–22)
CALCIUM ALBUM COR SERPL-MCNC: 8.7 MG/DL (ref 8.5–10.5)
CALCIUM SERPL-MCNC: 9.1 MG/DL (ref 8.5–10.5)
CHLORIDE SERPL-SCNC: 104 MMOL/L (ref 96–112)
CHOLEST SERPL-MCNC: 188 MG/DL (ref 100–199)
CO2 SERPL-SCNC: 23 MMOL/L (ref 20–33)
CREAT SERPL-MCNC: 0.63 MG/DL (ref 0.5–1.4)
FASTING STATUS PATIENT QL REPORTED: NORMAL
GFR SERPLBLD CREATININE-BSD FMLA CKD-EPI: 116 ML/MIN/1.73 M 2
GLOBULIN SER CALC-MCNC: 2.5 G/DL (ref 1.9–3.5)
GLUCOSE SERPL-MCNC: 188 MG/DL (ref 65–99)
HDLC SERPL-MCNC: 46 MG/DL
LDLC SERPL CALC-MCNC: 107 MG/DL
POTASSIUM SERPL-SCNC: 4.5 MMOL/L (ref 3.6–5.5)
PROT SERPL-MCNC: 7 G/DL (ref 6–8.2)
SODIUM SERPL-SCNC: 138 MMOL/L (ref 135–145)
TRIGL SERPL-MCNC: 174 MG/DL (ref 0–149)

## 2024-06-24 PROCEDURE — 80053 COMPREHEN METABOLIC PANEL: CPT

## 2024-06-24 PROCEDURE — 82172 ASSAY OF APOLIPOPROTEIN: CPT

## 2024-06-24 PROCEDURE — 36415 COLL VENOUS BLD VENIPUNCTURE: CPT

## 2024-06-24 PROCEDURE — 80061 LIPID PANEL: CPT

## 2024-06-24 PROCEDURE — 83695 ASSAY OF LIPOPROTEIN(A): CPT

## 2024-06-24 RX ORDER — EZETIMIBE 10 MG/1
10 TABLET ORAL DAILY
Qty: 30 TABLET | Refills: 6 | Status: SHIPPED | OUTPATIENT
Start: 2024-06-24

## 2024-06-24 NOTE — PROGRESS NOTES
"Patient Consult Note     TIME IN: 8:53 AM  TIME OUT: 918 am       Primary care physician: CLAUDE Enriquez    Reason for consult: Uncontrolled diabetes and dyslipidemia    HPI:  Gianluca Jasso is a 48 y.o. old patient who comes in today for evaluation of above stated problem.    Allergies:  Patient has no known allergies.    Physical Examination:  Vital signs: /74   Pulse 75   Ht 1.88 m (6' 2\")   Wt 98.9 kg (218 lb)   SpO2 97%   BMI 27.99 kg/m²  Body mass index is 27.99 kg/m².    Most Recent labs, A1c, and immunizations:    No results found for: \"INR\"  No results found for: \"POCINR\"   Lab Results   Component Value Date/Time    HBA1C 11.5 (A) 06/18/2024 11:11 AM      Lab Results   Component Value Date/Time    CHOLSTRLTOT 256 (H) 01/27/2023 08:09 AM     (H) 01/27/2023 08:09 AM    HDL 49 01/27/2023 08:09 AM    TRIGLYCERIDE 259 (H) 01/27/2023 08:09 AM       No results found for: \"LIPOPROTA\"   Low (favorable) = < 30 mg/dl or < 75 nmol/L  Intermediate =  30-49 mg/dl or  nmol/L  High (unfavorable) = >50 mg/dl or >125 nmol/L  Very High = >100 mg/dl or >225 nmol/L    No results found for: \"APOB\"    LDL-C < 55 = apoB <60 mg/dl  LDL-C <70 = apoB <70 mg/dl  LDL-C <100 = apo B <90 mg dl    Lab Results   Component Value Date/Time    SODIUM 137 01/27/2023 08:09 AM    POTASSIUM 4.5 01/27/2023 08:09 AM    CHLORIDE 101 01/27/2023 08:09 AM    CO2 27 01/27/2023 08:09 AM    GLUCOSE 189 (H) 01/27/2023 08:09 AM    BUN 15 01/27/2023 08:09 AM    CREATININE 0.78 01/27/2023 08:09 AM    BUNCREATRAT 16 12/13/2019 06:15 AM     Lab Results   Component Value Date/Time    ALKPHOSPHAT 89 01/27/2023 08:09 AM    ASTSGOT 9 (L) 01/27/2023 08:09 AM    ALTSGPT 17 01/27/2023 08:09 AM    TBILIRUBIN 0.5 01/27/2023 08:09 AM    ALBUMIN 4.4 01/27/2023 08:09 AM      Lab Results   Component Value Date/Time    RBC 4.90 09/09/2021 09:19 AM    HEMOGLOBIN 14.6 09/09/2021 09:19 AM    HEMATOCRIT 43.2 09/09/2021 09:19 AM    MCV " "88.2 09/09/2021 09:19 AM    MCH 29.8 09/09/2021 09:19 AM    MCHC 33.8 09/09/2021 09:19 AM    MPV 11.1 09/09/2021 09:19 AM      Lab Results   Component Value Date/Time    MALBCRT 31 (H) 06/18/2024 11:28 AM    MICROALBUR 1.6 06/18/2024 11:28 AM     No results found for: \"TSHULTRASEN\"  No results found for: \"FREET4\"  No results found for: \"FREET3\"  No results found for: \"THYSTIMIG\"          Medications:    Current Outpatient Medications:     Dulaglutide, 0.5 mL, Subcutaneous, Q7 DAYS    ezetimibe, 10 mg, Oral, DAILY    metformin, 1,000 mg, Oral, BID WITH MEALS    Empagliflozin, 10 mg, Oral, DAILY    atorvastatin, 80 mg, Oral, Nightly    lisinopril, 2.5 mg, Oral, DAILY    Dexcom G7 Sensor, 1 Application, Does not apply, Q10 DAYS    Dexcom G7 , 1 Device, Does not apply, DAILY    Assessment and Plan:  1. DM2   REC score   2  A1c above goal and dyslipidemia  Most recent A1c is: 11.5  Is Medication cost affordable: y  Kidney function:  Within normal limits, except for mild proteinuria  Any hypoglycemia: n            Medication(s) recommended after today's visit:   Continue metformin 1000 mg twice a day  Start Jardiance 10 mg once daily  Start Trulicity 0.75 mg every 7 days  CGM: Continue Dexcom G7    2.  Cardiovascular  Is LDL <100:    Is Blood pressure <130/80:  y    Medication(s) recommended.   Statin:   Continue atorvastatin 80 mg once daily  Continue Zetia 10 mg once daily  ACE/ARB:   Continue lisinopril 2.5 mg once daily    3.  Lifestyle changes   Focus on eating a DASH/Mediterranean-style diet.   Exercise 30 minutes daily at least 5 days/week, as tolerated.  https://www.niddk.nih.gov/bwp    https://www.nhlbi.nih.gov/education/dash-eating-plan        Follow-up appointment in 4 week(s) with PCP and 8 weeks with me    Reuben Urrutia, PharmD, MS, BCACP, LCC  [facility]  Phone [phone] fax [phone]    This note was created using voice recognition software (Dragon). The " accuracy of the dictation is limited by the abilities of the software. I have reviewed the note prior to signing, however some errors in grammar and context are still possible. If you have any questions related to this note please do not hesitate to contact our office.     CC:   Eliu Baker, Ileana Tatum*  Dr. Christian Cuevas

## 2024-06-26 LAB
APO B100 SERPL-MCNC: 106 MG/DL (ref 66–133)
LPA SERPL-MCNC: <6 MG/DL

## 2024-07-02 ENCOUNTER — TELEPHONE (OUTPATIENT)
Dept: ENDOCRINOLOGY | Facility: MEDICAL CENTER | Age: 49
End: 2024-07-02
Payer: COMMERCIAL

## 2024-07-18 ENCOUNTER — OFFICE VISIT (OUTPATIENT)
Dept: MEDICAL GROUP | Facility: PHYSICIAN GROUP | Age: 49
End: 2024-07-18
Payer: COMMERCIAL

## 2024-07-18 ENCOUNTER — HOSPITAL ENCOUNTER (OUTPATIENT)
Dept: RADIOLOGY | Facility: MEDICAL CENTER | Age: 49
End: 2024-07-18
Attending: NURSE PRACTITIONER
Payer: COMMERCIAL

## 2024-07-18 VITALS
HEIGHT: 74 IN | SYSTOLIC BLOOD PRESSURE: 120 MMHG | TEMPERATURE: 97.2 F | WEIGHT: 211.4 LBS | OXYGEN SATURATION: 94 % | HEART RATE: 92 BPM | BODY MASS INDEX: 27.13 KG/M2 | DIASTOLIC BLOOD PRESSURE: 64 MMHG

## 2024-07-18 DIAGNOSIS — E78.1 HYPERTRIGLYCERIDEMIA: ICD-10-CM

## 2024-07-18 DIAGNOSIS — E11.65 UNCONTROLLED TYPE 2 DIABETES MELLITUS WITH HYPERGLYCEMIA (HCC): ICD-10-CM

## 2024-07-18 DIAGNOSIS — S49.92XA ARM INJURY, LEFT, INITIAL ENCOUNTER: ICD-10-CM

## 2024-07-18 DIAGNOSIS — Z12.11 SCREENING FOR COLON CANCER: ICD-10-CM

## 2024-07-18 PROCEDURE — 73080 X-RAY EXAM OF ELBOW: CPT | Mod: LT

## 2024-07-18 PROCEDURE — 3074F SYST BP LT 130 MM HG: CPT | Performed by: NURSE PRACTITIONER

## 2024-07-18 PROCEDURE — 99214 OFFICE O/P EST MOD 30 MIN: CPT | Performed by: NURSE PRACTITIONER

## 2024-07-18 PROCEDURE — 3078F DIAST BP <80 MM HG: CPT | Performed by: NURSE PRACTITIONER

## 2024-08-20 DIAGNOSIS — E11.65 UNCONTROLLED TYPE 2 DIABETES MELLITUS WITH HYPERGLYCEMIA (HCC): ICD-10-CM

## 2024-08-21 NOTE — TELEPHONE ENCOUNTER
Received request via: Pharmacy    Was the patient seen in the last year in this department? Yes    Does the patient have an active prescription (recently filled or refills available) for medication(s) requested? No    Pharmacy Name: [facility] [address]    Does the patient have [facility] Plus and need 100-day supply? (This applies to ALL medications) Patient does not have SCP

## 2024-08-22 RX ORDER — EMPAGLIFLOZIN 10 MG/1
1 TABLET, FILM COATED ORAL
Qty: 90 TABLET | Refills: 3 | Status: SHIPPED | OUTPATIENT
Start: 2024-08-22

## 2024-08-29 ENCOUNTER — TELEPHONE (OUTPATIENT)
Dept: MEDICAL GROUP | Facility: IMAGING CENTER | Age: 49
End: 2024-08-29
Payer: COMMERCIAL

## 2024-08-29 NOTE — TELEPHONE ENCOUNTER
LVM to reschedule a patient's appointment on 09/19/2024 with Eliu Kam. Eliu Kam will be out-of-office at that time. Patient was instructed to call [phone] or use the [employer] application to reschedule at their earliest convenience.

## 2024-10-01 ENCOUNTER — TELEPHONE (OUTPATIENT)
Dept: MEDICAL GROUP | Facility: PHYSICIAN GROUP | Age: 49
End: 2024-10-01
Payer: COMMERCIAL

## 2024-10-03 ENCOUNTER — OFFICE VISIT (OUTPATIENT)
Dept: MEDICAL GROUP | Facility: PHYSICIAN GROUP | Age: 49
End: 2024-10-03
Payer: COMMERCIAL

## 2024-10-03 VITALS
HEART RATE: 78 BPM | TEMPERATURE: 98.3 F | WEIGHT: 205.4 LBS | BODY MASS INDEX: 26.36 KG/M2 | OXYGEN SATURATION: 96 % | SYSTOLIC BLOOD PRESSURE: 108 MMHG | HEIGHT: 74 IN | DIASTOLIC BLOOD PRESSURE: 64 MMHG

## 2024-10-03 DIAGNOSIS — E11.65 UNCONTROLLED TYPE 2 DIABETES MELLITUS WITH HYPERGLYCEMIA (HCC): ICD-10-CM

## 2024-10-03 DIAGNOSIS — E11.9 TYPE 2 DIABETES MELLITUS WITHOUT COMPLICATION, WITHOUT LONG-TERM CURRENT USE OF INSULIN (HCC): Primary | ICD-10-CM

## 2024-10-03 LAB
HBA1C MFR BLD: 6.4 % (ref ?–5.8)
POCT INT CON NEG: NEGATIVE
POCT INT CON POS: POSITIVE

## 2024-10-03 PROCEDURE — 83036 HEMOGLOBIN GLYCOSYLATED A1C: CPT | Performed by: FAMILY MEDICINE

## 2024-10-03 PROCEDURE — 99213 OFFICE O/P EST LOW 20 MIN: CPT | Performed by: FAMILY MEDICINE

## 2024-10-03 PROCEDURE — 3078F DIAST BP <80 MM HG: CPT | Performed by: FAMILY MEDICINE

## 2024-10-03 PROCEDURE — 3074F SYST BP LT 130 MM HG: CPT | Performed by: FAMILY MEDICINE

## 2024-10-03 RX ORDER — ACYCLOVIR 400 MG/1
TABLET ORAL
Qty: 3 EACH | Refills: 0 | Status: SHIPPED | OUTPATIENT
Start: 2024-10-03

## 2024-11-04 ENCOUNTER — OFFICE VISIT (OUTPATIENT)
Dept: URGENT CARE | Facility: PHYSICIAN GROUP | Age: 49
End: 2024-11-04
Payer: COMMERCIAL

## 2024-11-04 VITALS
DIASTOLIC BLOOD PRESSURE: 70 MMHG | RESPIRATION RATE: 18 BRPM | WEIGHT: 199 LBS | BODY MASS INDEX: 25.54 KG/M2 | TEMPERATURE: 96.9 F | HEIGHT: 74 IN | SYSTOLIC BLOOD PRESSURE: 110 MMHG | OXYGEN SATURATION: 95 % | HEART RATE: 86 BPM

## 2024-11-04 DIAGNOSIS — J01.40 ACUTE NON-RECURRENT PANSINUSITIS: ICD-10-CM

## 2024-11-04 DIAGNOSIS — H10.31 ACUTE BACTERIAL CONJUNCTIVITIS OF RIGHT EYE: ICD-10-CM

## 2024-11-04 DIAGNOSIS — J20.9 BRONCHITIS WITH BRONCHOSPASM: ICD-10-CM

## 2024-11-04 PROCEDURE — 3074F SYST BP LT 130 MM HG: CPT | Performed by: NURSE PRACTITIONER

## 2024-11-04 PROCEDURE — 3078F DIAST BP <80 MM HG: CPT | Performed by: NURSE PRACTITIONER

## 2024-11-04 PROCEDURE — 99213 OFFICE O/P EST LOW 20 MIN: CPT | Performed by: NURSE PRACTITIONER

## 2024-11-04 RX ORDER — DEXTROMETHORPHAN HYDROBROMIDE AND PROMETHAZINE HYDROCHLORIDE 15; 6.25 MG/5ML; MG/5ML
5 SYRUP ORAL EVERY 6 HOURS PRN
Qty: 118 ML | Refills: 0 | Status: SHIPPED | OUTPATIENT
Start: 2024-11-04 | End: 2024-11-11

## 2024-11-04 RX ORDER — OFLOXACIN 3 MG/ML
1 SOLUTION/ DROPS OPHTHALMIC 4 TIMES DAILY
Qty: 10 ML | Refills: 0 | Status: SHIPPED | OUTPATIENT
Start: 2024-11-04 | End: 2024-11-11

## 2024-11-04 RX ORDER — LEVALBUTEROL TARTRATE 45 UG/1
1-2 AEROSOL, METERED ORAL EVERY 6 HOURS PRN
Qty: 15 G | Refills: 1 | Status: SHIPPED | OUTPATIENT
Start: 2024-11-04

## 2024-11-04 NOTE — PROGRESS NOTES
Verbal consent was acquired by the patient to use [employer] ambient listening note generation during this visit     Date: 11/04/24        Chief Complaint   Patient presents with    Cough     Sore throat, x4/5 days           History of Present Illness  The patient is a 49-year-old male who presents for evaluation of a cough.    He began feeling unwell last Tuesday, 7 days ago, experiencing coughing fits particularly at night. He experiences pain when coughing excessively. His inhaler provided significant relief, but he has since run out. He has a history of asthma from his youth. He reports a runny nose and phlegm production. He has not experienced fevers or body aches in recent days. He has not experienced headaches, nausea, vomiting, or diarrhea. He has never been diagnosed with COPD.    His blood sugar levels have been at baseline    He experienced eye discharge on Saturday, which was alleviated with over-the-counter eye drops.  He does wear contacts intermittently but none recently.    SOCIAL HISTORY  He quit smoking in 2020.    ALLERGIES  He has no known drug allergies.         ROS:    No severe shortness of breath   No Cardiac like chest pain, as discussed   As otherwise stated in HPI    Medical/SX/ Social History:  Reviewed per chart    Pertinent Medications:    Current Outpatient Medications on File Prior to Visit   Medication Sig Dispense Refill    Continuous Glucose Sensor (DEXCOM G7 SENSOR) Misc CHANGE SENSOR EVERY 10 DAYS. 3 Each 0    Empagliflozin (JARDIANCE) 10 MG Tab tablet TAKE 1 TABLET BY MOUTH EVERY DAY 90 Tablet 3    metformin (GLUCOPHAGE) 500 MG Tab Take 1 Tablet by mouth 2 times a day with meals for 180 days. 180 Tablet 1    Dulaglutide 0.75 MG/0.5ML Solution Pen-injector Inject 0.5 mL under the skin every 7 days. 1.96 mL 6    ezetimibe (ZETIA) 10 MG Tab Take 1 Tablet by mouth every day. 30 Tablet 6    atorvastatin (LIPITOR) 80 MG tablet Take 1 Tablet by mouth every evening. 90 Tablet 3     lisinopril (PRINIVIL) 2.5 MG Tab Take 1 Tablet by mouth every day for 360 days. 90 Tablet 3    Continuous Glucose  (DEXCOM G7 ) Device 1 Device every day. 1 Each 0     No current facility-administered medications on file prior to visit.        Allergies:    Patient has no known allergies.     Problem list, medications, and allergies reviewed by myself today in Epic     Physical Exam:    Vitals:    11/04/24 1243   BP: 110/70   Pulse: 86   Resp: 18   Temp: 36.1 °C (96.9 °F)   SpO2: 95%             Physical Exam  Constitutional:       General: He is awake.      Appearance: Normal appearance. He is not ill-appearing, toxic-appearing or diaphoretic.   HENT:      Head: Normocephalic and atraumatic.      Right Ear: Tympanic membrane, ear canal and external ear normal.      Left Ear: Tympanic membrane, ear canal and external ear normal.      Nose: Mucosal edema, congestion and rhinorrhea present.      Right Turbinates: Swollen.      Left Turbinates: Swollen.      Right Sinus: Maxillary sinus tenderness present. No frontal sinus tenderness.      Left Sinus: No maxillary sinus tenderness or frontal sinus tenderness.      Mouth/Throat:      Lips: Pink.      Mouth: Mucous membranes are moist.      Tongue: No lesions.      Palate: No lesions.      Pharynx: Posterior oropharyngeal erythema and postnasal drip present. No oropharyngeal exudate or uvula swelling.      Tonsils: No tonsillar exudate or tonsillar abscesses.   Eyes:      General: Lids are normal. Gaze aligned appropriately. No allergic shiner or scleral icterus.     Extraocular Movements: Extraocular movements intact.      Conjunctiva/sclera: Conjunctivae normal.      Pupils: Pupils are equal, round, and reactive to light.   Cardiovascular:      Rate and Rhythm: Normal rate and regular rhythm.      Pulses:           Radial pulses are 2+ on the right side and 2+ on the left side.      Heart sounds: Normal heart sounds.   Pulmonary:      Effort: Pulmonary  effort is normal.      Breath sounds: Normal breath sounds and air entry. No decreased breath sounds, wheezing, rhonchi or rales.   Abdominal:      General: Abdomen is flat. Bowel sounds are normal.      Palpations: Abdomen is soft.      Tenderness: There is no abdominal tenderness.   Musculoskeletal:      Right lower leg: No edema.      Left lower leg: No edema.   Lymphadenopathy:      Cervical: Cervical adenopathy present.      Right cervical: Superficial cervical adenopathy present. No deep or posterior cervical adenopathy.     Left cervical: No superficial, deep or posterior cervical adenopathy.   Skin:     General: Skin is warm.      Capillary Refill: Capillary refill takes less than 2 seconds.      Coloration: Skin is not cyanotic or pale.   Neurological:      Mental Status: He is alert and oriented to person, place, and time.      Gait: Gait is intact.   Psychiatric:         Behavior: Behavior normal. Behavior is cooperative.                Medical Decision making and plan :  I personally reviewed prior external notes and test results pertinent to today's visit. Pt is clinically stable at today's acute urgent care visit.  Patient appears nontoxic with no acute distress noted. Appropriate for outpatient care at this time.    Pleasant 49 y.o. male presented clinic with:     Assessment & Plan  1. Bronchitis.  Symptoms include a sore throat, cough with coughing fits, and phlegm production, which are worse at night. His lungs sound good on examination. A refill of the albuterol inhaler will be provided as it has been helpful. A nighttime cough medication will also be prescribed to help with sleep and reduce phlegm. If the cough worsens, he should return for further evaluation.    2. Sinus Infection.  He presents with a red throat, swollen lymph nodes, and swelling in the nose, particularly on the right side. There is also a history of green discharge from the eye, which could indicate bacterial conjunctivitis or  molly. Augmentin will be prescribed for a 7-day course, to be taken with food. Ofloxacin eyedrops will be prescribed, to be administered as 1 drop in the right eye 4 times daily for 7 days. If symptoms develop in the left eye, the same treatment should be applied. If the eyedrops are more than $30, he should contact the clinic for an alternative prescription.          Shared decision-making was utilized with patient for treatment plan. Medication discussed included indication for use and the potential benefits and side effects. Education was provided regarding the aforementioned assessments.  Differential Diagnosis, natural history, and supportive care discussed. All of the patient's questions were answered to their satisfaction at the time of discharge. Patient was encouraged to monitor symptoms closely. Those signs and symptoms which would warrant concern and mandate seeking a higher level of service through the emergency department discussed at length.  Patient stated agreement and understanding of this plan of care.    Disposition:  Home in stable condition     Voice Recognition Disclaimer:  Portions of this document were created using voice recognition software and [employer] technology provided by [employer]. The software does have a chance of producing errors of grammar and possibly content. I have made every reasonable attempt to correct obvious errors, but there may be errors of grammar and possibly content that I did not discover before finalizing the  documentation.    [email or web address].

## 2024-11-17 DIAGNOSIS — E11.65 UNCONTROLLED TYPE 2 DIABETES MELLITUS WITH HYPERGLYCEMIA (HCC): ICD-10-CM

## 2024-11-18 RX ORDER — ACYCLOVIR 400 MG/1
TABLET ORAL
Qty: 3 EACH | Refills: 0 | Status: SHIPPED | OUTPATIENT
Start: 2024-11-18

## 2024-11-18 NOTE — TELEPHONE ENCOUNTER
Received request via: Pharmacy    Was the patient seen in the last year in this department? Yes    Does the patient have an active prescription (recently filled or refills available) for medication(s) requested? No    Pharm[facility] Name: [facility]    Does the patient have halfway Plus and need 100-day supply? (This applies to ALL medications) Patient does not have SCP

## 2024-12-05 RX ORDER — EZETIMIBE 10 MG/1
10 TABLET ORAL DAILY
Qty: 90 TABLET | Refills: 0 | Status: SHIPPED | OUTPATIENT
Start: 2024-12-05

## 2024-12-21 DIAGNOSIS — E11.65 UNCONTROLLED TYPE 2 DIABETES MELLITUS WITH HYPERGLYCEMIA (HCC): ICD-10-CM

## 2024-12-23 RX ORDER — ACYCLOVIR 400 MG/1
TABLET ORAL
Qty: 3 EACH | Refills: 2 | Status: SHIPPED | OUTPATIENT
Start: 2024-12-23

## 2025-01-06 ENCOUNTER — OFFICE VISIT (OUTPATIENT)
Dept: MEDICAL GROUP | Facility: PHYSICIAN GROUP | Age: 50
End: 2025-01-06
Payer: COMMERCIAL

## 2025-01-06 VITALS
HEIGHT: 74 IN | WEIGHT: 210 LBS | DIASTOLIC BLOOD PRESSURE: 74 MMHG | BODY MASS INDEX: 26.95 KG/M2 | TEMPERATURE: 98 F | SYSTOLIC BLOOD PRESSURE: 106 MMHG | OXYGEN SATURATION: 98 % | HEART RATE: 76 BPM

## 2025-01-06 DIAGNOSIS — Z23 NEED FOR VACCINATION: ICD-10-CM

## 2025-01-06 DIAGNOSIS — Z11.59 NEED FOR HEPATITIS C SCREENING TEST: ICD-10-CM

## 2025-01-06 DIAGNOSIS — E78.2 MIXED HYPERLIPIDEMIA: ICD-10-CM

## 2025-01-06 DIAGNOSIS — Z11.4 SCREENING FOR HIV (HUMAN IMMUNODEFICIENCY VIRUS): ICD-10-CM

## 2025-01-06 DIAGNOSIS — E11.9 TYPE 2 DIABETES MELLITUS WITHOUT COMPLICATION, WITHOUT LONG-TERM CURRENT USE OF INSULIN (HCC): ICD-10-CM

## 2025-01-06 PROBLEM — E78.00 HYPERCHOLESTEREMIA: Status: RESOLVED | Noted: 2023-01-30 | Resolved: 2025-01-06

## 2025-01-06 PROBLEM — E78.1 HYPERTRIGLYCERIDEMIA: Status: RESOLVED | Noted: 2018-08-23 | Resolved: 2025-01-06

## 2025-01-06 LAB
HBA1C MFR BLD: 6.2 % (ref ?–5.8)
POCT INT CON NEG: NEGATIVE
POCT INT CON POS: POSITIVE

## 2025-01-06 PROCEDURE — 90471 IMMUNIZATION ADMIN: CPT | Performed by: NURSE PRACTITIONER

## 2025-01-06 PROCEDURE — 3078F DIAST BP <80 MM HG: CPT | Performed by: NURSE PRACTITIONER

## 2025-01-06 PROCEDURE — 3074F SYST BP LT 130 MM HG: CPT | Performed by: NURSE PRACTITIONER

## 2025-01-06 PROCEDURE — 99214 OFFICE O/P EST MOD 30 MIN: CPT | Mod: 25 | Performed by: NURSE PRACTITIONER

## 2025-01-06 PROCEDURE — 83036 HEMOGLOBIN GLYCOSYLATED A1C: CPT | Performed by: NURSE PRACTITIONER

## 2025-01-06 PROCEDURE — 90746 HEPB VACCINE 3 DOSE ADULT IM: CPT | Performed by: NURSE PRACTITIONER

## 2025-01-06 RX ORDER — EMPAGLIFLOZIN 10 MG/1
1 TABLET, FILM COATED ORAL
Qty: 90 TABLET | Refills: 3 | Status: SHIPPED | OUTPATIENT
Start: 2025-01-06

## 2025-01-06 RX ORDER — DULAGLUTIDE 0.75 MG/.5ML
0.5 INJECTION, SOLUTION SUBCUTANEOUS
Qty: 6 ML | Refills: 3 | Status: SHIPPED | OUTPATIENT
Start: 2025-01-06

## 2025-01-06 RX ORDER — ATORVASTATIN CALCIUM 80 MG/1
80 TABLET, FILM COATED ORAL NIGHTLY
Qty: 90 TABLET | Refills: 3 | Status: SHIPPED | OUTPATIENT
Start: 2025-01-06

## 2025-01-06 RX ORDER — EZETIMIBE 10 MG/1
10 TABLET ORAL DAILY
Qty: 90 TABLET | Refills: 3 | Status: SHIPPED | OUTPATIENT
Start: 2025-01-06

## 2025-01-06 RX ORDER — ACYCLOVIR 400 MG/1
1 TABLET ORAL
Qty: 3 EACH | Refills: 6 | Status: SHIPPED | OUTPATIENT
Start: 2025-01-06 | End: 2025-01-06 | Stop reason: SDUPTHER

## 2025-01-06 RX ORDER — DULAGLUTIDE 0.75 MG/.5ML
0.5 INJECTION, SOLUTION SUBCUTANEOUS
Qty: 2 ML | Refills: 6 | Status: SHIPPED | OUTPATIENT
Start: 2025-01-06 | End: 2025-01-06 | Stop reason: SDUPTHER

## 2025-01-06 RX ORDER — ACYCLOVIR 400 MG/1
1 TABLET ORAL
Qty: 3 EACH | Refills: 12 | Status: SHIPPED | OUTPATIENT
Start: 2025-01-06

## 2025-01-06 ASSESSMENT — PATIENT HEALTH QUESTIONNAIRE - PHQ9: CLINICAL INTERPRETATION OF PHQ2 SCORE: 0

## 2025-01-06 NOTE — PROGRESS NOTES
RN-CDE Note    Subjective:     HPI:  Gianluca Jasso is a 49 y.o. old patient who is seen by the Diabetes Nurse Specialist today for review of his controlled type 2 diabetes.    Changes in Health: none    Diabetes Medications:   Trulicity 0.75 mg weekly  Metformin 500 mg daily  Jardiance 10 mg daily  Taking above medications as prescribed:  has only been taking his Metformin daily  Taking daily ASA: Not Indicated    Exercise: states he is getting a little exercise in.   Diet: breakfast: usually skips and drinks some coffee with a little coconut milk  Lunch: meat and vegetables  Dinner: same  Patient's body mass index is 26.96 kg/m². Exercise and nutrition counseling were performed at this visit.      Health Maintenance:   Health Maintenance Due   Topic Date Due    HIV Screening  Never done    Hepatitis C Screening  Never done    Hepatitis B Vaccine (Hep B) (1 of 3 - 19+ 3-dose series) Never done    Zoster (Shingles) Vaccines (1 of 2) Never done    A1c Screening  01/03/2025         DM:   Last A1c:   Lab Results   Component Value Date/Time    HBA1C 6.2 (A) 01/06/2025 03:09 PM      Previous A1c was 6.4 on 10/03/2024  A1C GOAL: < 7    Glucose monitoring frequency: using Dexcom     Hypoglycemic episodes: not since he stopped taking Metformin in the evening.     Last Retinal Exam: on file and up-to-date  Daily Foot Exam: Yes     Exam:  Monofilament: current    Lab Results   Component Value Date/Time    MALBCRT 31 (H) 06/18/2024 11:28 AM    MICROALBUR 1.6 06/18/2024 11:28 AM    MICRALB 6.4 12/13/2019 06:15 AM        ACR Albumin/Creatinine Ratio goal <30     HTN:   Blood pressure goal <130/<80 .   Currently Rx ACE/ARB:  Lisinopril 2.5 mg daily    Dyslipidemia:    Lab Results   Component Value Date/Time    CHOLSTRLTOT 188 06/24/2024 09:42 AM     (H) 06/24/2024 09:42 AM    HDL 46 06/24/2024 09:42 AM    TRIGLYCERIDE 174 (H) 06/24/2024 09:42 AM         Currently Rx Statin:  Atorvastatin 80 mg daily    He  reports that he  quit smoking about 4 years ago. His smoking use included cigarettes. He has never used smokeless tobacco.      Plan:     Discussed and educated on:   - All medications, side effects and compliance (discussed carefully)  - Annual eye examinations at Ophthalmology  - Factors Affecting Blood Glucose Control: food, medication, and stress  - HbA1C: target  - Home glucose monitoring emphasized  - Weight control and daily exercise    Recommended medication changes: no changes.

## 2025-01-07 NOTE — PROGRESS NOTES
Verbal consent was acquired by the patient to use [employer] ambient listening note generation during this visit yes    Subjective:     HPI:   History of Present Illness  The patient is a 49-year-old male who presents today to follow up on diabetes.    Diabetes Management  He has been managing his diabetes effectively, with a recent hemoglobin A1c level of 6.2, down from 6.4. He experienced a hypoglycemic episode approximately one month ago, with blood glucose levels dropping to the 50s and 60s. He reports no symptoms of numbness or tingling. He has been experiencing issues with timely refills of his Trulicity, Jardiance, and Dexcom prescriptions. He has been adhering to a diet low in sugars and fast foods, although his intake of fruits and vegetables remains minimal. His exercise regimen includes 45-minute sessions 3 to 5 times per week. In response, he reduced his metformin dosage from twice daily to once daily, which appears to have stabilized his condition. He continues to take Jardiance.  - Onset: Hypoglycemic episode approximately one month ago.  - Duration: Ongoing management of diabetes.  - Character: Hypoglycemic episode with blood glucose levels dropping to the 50s and 60s.  - Alleviating/Aggravating Factors: Reduced metformin dosage from twice daily to once daily; issues with timely refills of Trulicity, Jardiance, and Dexcom prescriptions.  - Timing: Regular exercise regimen of 45-minute sessions 3 to 5 times per week.  - Severity: Hemoglobin A1c level of 6.2, down from 6.4; no symptoms of numbness or tingling.    Hypertension  He has discontinued his low-dose lisinopril for several weeks, yet his blood pressure remains stable. It is hypothesized that his elevated blood pressure was a result of high blood sugar levels.  - Onset: Discontinued low-dose lisinopril for several weeks.  - Duration: Blood pressure remains stable since discontinuation.  - Character: Stable blood pressure.  -  "Alleviating/Aggravating Factors: Hypothesized that elevated blood pressure was due to high blood sugar levels.    Lipid Management  He is currently on ezetimibe and reports no associated pain or muscle aches. His lipid profile was last assessed in June 2024.  - Onset: Currently on ezetimibe.  - Character: No associated pain or muscle aches.  - Timing: Lipid profile last assessed in June 2024.    MEDICATIONS  - Current: Metformin  - Current: Jardiance  - Current: Trulicity  - Current: Zetia  - Current: Dexcom  - Discontinued: Lisinopril    Health Maintenance: Completed    Objective:     Exam:  /74   Pulse 76   Temp 36.7 °C (98 °F) (Temporal)   Ht 1.88 m (6' 2\")   Wt 95.3 kg (210 lb)   SpO2 98%   BMI 26.96 kg/m²  Body mass index is 26.96 kg/m².    Physical Exam  Constitutional:       Appearance: Normal appearance.   HENT:      Head: Normocephalic and atraumatic.   Cardiovascular:      Rate and Rhythm: Normal rate and regular rhythm.      Pulses: Normal pulses.      Heart sounds: Normal heart sounds.   Pulmonary:      Effort: Pulmonary effort is normal.      Breath sounds: Normal breath sounds.   Skin:     General: Skin is warm and dry.      Capillary Refill: Capillary refill takes less than 2 seconds.   Neurological:      General: No focal deficit present.      Mental Status: He is alert and oriented to person, place, and time.           Results  Laboratory Studies  Hemoglobin A1c is 6.2, it was 6.4 previously.    Assessment & Plan:     1. Type 2 diabetes mellitus without complication, without long-term current use of insulin ([facility])  POCT Hemoglobin A1C    Comp Metabolic Panel    Lipid Profile    HEMOGLOBIN A1C    ezetimibe (ZETIA) 10 MG Tab    Empagliflozin (JARDIANCE) 10 MG Tab tablet    Dulaglutide (TRULICITY) 0.75 MG/0.5ML Solution Auto-injector    Continuous Glucose Sensor (DEXCOM G7 SENSOR) Misc    atorvastatin (LIPITOR) 80 MG tablet      2. Uncontrolled type 2 diabetes mellitus with hyperglycemia " (HCC)  DISCONTINUED: Continuous Glucose Sensor (DEXCOM G7 SENSOR) Misc      3. Need for hepatitis C screening test  HCV Scrn ( 1309-4290 1xLife)      4. Screening for HIV (human immunodeficiency virus)  HIV AG/AB COMBO ASSAY SCREENING      5. Need for vaccination  Hepatitis B Vaccine Adult 20+          Assessment & Plan  1. Diabetes mellitus - Hemoglobin A1c improved from 6.4 to 6.2, indicating good control. Exercising 3-5 times a week and avoiding sugars and fast foods. Reduced metformin to once daily after an episode of low blood sugar. Blood sugar levels stabilized.  - Continue with Trulicity and Jardiance  - Refills for Trulicity, Jardiance, and Dexcom will be provided  - Consider discontinuing metformin if A1c remains stable  - Order laboratory tests before next follow-up visit    2. Hypertension - Blood pressure stable despite discontinuing lisinopril for a couple of weeks.  - Officially stop taking lisinopril  - Continue monitoring blood pressure    3. Hyperlipidemia - Currently on ezetimibe (Zetia) with no pain or muscle aches. No new lipid profile available.  - Continue monitoring    4. Health maintenance - Will receive hepatitis B vaccine today. Routine HIV and hepatitis C screening.  - Administer hepatitis B vaccine  - Conduct HIV and hepatitis C screening    Follow-up  - Follow up in 6 months        Return in about 6 months (around 2025) for DM.    I have placed the below orders and discussed them with an approved delegating provider. The MA is performing the below orders under the direction of Dr. Deluna.      Please note that this dictation was created using voice recognition software. I have made every reasonable attempt to correct obvious errors, but I expect that there are errors of grammar and possibly content that I did not discover before finalizing the note.

## 2025-03-16 DIAGNOSIS — E11.9 TYPE 2 DIABETES MELLITUS WITHOUT COMPLICATION, WITHOUT LONG-TERM CURRENT USE OF INSULIN (HCC): ICD-10-CM

## 2025-03-17 RX ORDER — ACYCLOVIR 400 MG/1
1 TABLET ORAL
Qty: 3 EACH | Refills: 12 | Status: SHIPPED | OUTPATIENT
Start: 2025-03-17

## 2025-03-17 NOTE — TELEPHONE ENCOUNTER
Received request via: Patient    Was the patient seen in the last year in this department? Yes    Does the patient have an active prescription (recently filled or refills available) for medication(s) requested? No    Pharmacy Name: [facility]/pharmacy #[id number] - [place], [place] - [address]     Does the patient have assisted Plus and need 100-day supply? (This applies to ALL medications) Patient does not have SCP

## 2025-07-08 ENCOUNTER — HOSPITAL ENCOUNTER (OUTPATIENT)
Facility: MEDICAL CENTER | Age: 50
End: 2025-07-08
Attending: NURSE PRACTITIONER
Payer: COMMERCIAL

## 2025-07-08 ENCOUNTER — OFFICE VISIT (OUTPATIENT)
Dept: MEDICAL GROUP | Facility: PHYSICIAN GROUP | Age: 50
End: 2025-07-08
Payer: COMMERCIAL

## 2025-07-08 VITALS
DIASTOLIC BLOOD PRESSURE: 64 MMHG | HEART RATE: 85 BPM | WEIGHT: 213 LBS | TEMPERATURE: 97.8 F | HEIGHT: 74 IN | SYSTOLIC BLOOD PRESSURE: 126 MMHG | BODY MASS INDEX: 27.34 KG/M2 | OXYGEN SATURATION: 95 %

## 2025-07-08 DIAGNOSIS — E11.9 TYPE 2 DIABETES MELLITUS WITHOUT COMPLICATION, WITHOUT LONG-TERM CURRENT USE OF INSULIN (HCC): ICD-10-CM

## 2025-07-08 DIAGNOSIS — Z11.4 ENCOUNTER FOR SCREENING FOR HIV: Primary | ICD-10-CM

## 2025-07-08 DIAGNOSIS — Z11.59 NEED FOR HEPATITIS C SCREENING TEST: ICD-10-CM

## 2025-07-08 LAB
CREAT UR-MCNC: 70.7 MG/DL
HBA1C MFR BLD: 6.6 % (ref ?–5.8)
MICROALBUMIN UR-MCNC: <1.2 MG/DL
MICROALBUMIN/CREAT UR: NORMAL MG/G (ref 0–30)
POCT INT CON NEG: NEGATIVE
POCT INT CON POS: POSITIVE

## 2025-07-08 PROCEDURE — 82043 UR ALBUMIN QUANTITATIVE: CPT

## 2025-07-08 PROCEDURE — 3078F DIAST BP <80 MM HG: CPT | Performed by: NURSE PRACTITIONER

## 2025-07-08 PROCEDURE — 82570 ASSAY OF URINE CREATININE: CPT

## 2025-07-08 PROCEDURE — 3074F SYST BP LT 130 MM HG: CPT | Performed by: NURSE PRACTITIONER

## 2025-07-08 PROCEDURE — 99214 OFFICE O/P EST MOD 30 MIN: CPT | Performed by: NURSE PRACTITIONER

## 2025-07-08 PROCEDURE — 92250 FUNDUS PHOTOGRAPHY W/I&R: CPT | Performed by: NURSE PRACTITIONER

## 2025-07-08 PROCEDURE — 83036 HEMOGLOBIN GLYCOSYLATED A1C: CPT | Performed by: NURSE PRACTITIONER

## 2025-07-08 RX ORDER — DULAGLUTIDE 0.75 MG/.5ML
0.5 INJECTION, SOLUTION SUBCUTANEOUS
Qty: 6 ML | Refills: 3 | Status: SHIPPED | OUTPATIENT
Start: 2025-07-08

## 2025-07-08 NOTE — PROGRESS NOTES
Verbal consent was acquired by the patient to use [employer] ambient listening note generation during this visit yes    Subjective:     HPI:   History of Present Illness  The patient presents for evaluation of diabetes mellitus.    Diabetes Mellitus Management  He has observed that consuming food from outside establishments, such as takeout, leads to an increase in his blood sugar levels. Currently, he is on a regimen of Trulicity 0.75 mg every other week, which he reports as being effective, and he also takes Jardiance 10 mg. He has noticed that maintaining a balanced diet helps manage his condition. This morning, he consumed a banana, grapes, blueberries, and some meat for breakfast. He reports no numbness or tingling in his feet.  - Onset: Observed increase in blood sugar levels with outside food consumption.  - Alleviating/Aggravating Factors: Balanced diet helps manage condition; Trulicity and Jardiance are effective.  - Severity: No numbness or tingling in feet.    Weight Loss  He has been experiencing significant weight loss, which has raised concerns. His current weight is 213 pounds, but he feels comfortable around 199 to 200 pounds. Previously, he had lost weight down to 190 pounds, which he found to be too low.  - Onset: Significant weight loss observed.  - Severity: Comfortable around 199 to 200 pounds; 190 pounds was too low.    Small Bump on Side  He mentions a small bump on his side, which is not painful.  - Character: Small bump, not painful.    Colonoscopy Observations  He recently underwent a colonoscopy on 07/2025, during which it was noted that his heart rate was slow. However, he reports no chest pain or pressure. He does experience fatigue, but attributes this to his physically demanding work.  - Onset: Noted slow heart rate during colonoscopy on 07/2025.  - Character: Slow heart rate, no chest pain or pressure.  - Severity: Experiences fatigue, attributed to physically demanding  "work.    Health Maintenance: Completed    Objective:     Exam:  /64 (BP Location: Left arm, Patient Position: Sitting, BP Cuff Size: Adult)   Pulse 85   Temp 36.6 °C (97.8 °F) (Temporal)   Ht 1.88 m (6' 2\")   Wt 96.6 kg (213 lb)   SpO2 95%   BMI 27.35 kg/m²  Body mass index is 27.35 kg/m².    Physical Exam  Constitutional:       Appearance: Normal appearance.   HENT:      Head: Normocephalic and atraumatic.   Cardiovascular:      Rate and Rhythm: Normal rate and regular rhythm.      Pulses: Normal pulses.      Heart sounds: Normal heart sounds.   Pulmonary:      Effort: Pulmonary effort is normal.      Breath sounds: Normal breath sounds.   Musculoskeletal:      Thoracic back: Normal.      Lumbar back: Normal.      Comments: No swelling or mass noted near left shoulder where patient indicates   Skin:     General: Skin is warm and dry.   Neurological:      General: No focal deficit present.      Mental Status: He is alert and oriented to person, place, and time.         Results  Labs   - A1c: 07/08/2025, 6.6    Assessment & Plan:     1. Encounter for screening for HIV  HIV AG/AB COMBO ASSAY SCREENING      2. Type 2 diabetes mellitus without complication, without long-term current use of insulin (HCC)  POCT A1C    POCT Retinal Eye Exam    Microalbumin Creat Ratio Urine (Clinic Collect)    Diabetic Monofilament LE Exam    Lipid Profile    Comp Metabolic Panel    Dulaglutide (TRULICITY) 0.75 MG/0.5ML Solution Auto-injector      3. Need for hepatitis C screening test  HEP C VIRUS ANTIBODY          Assessment & Plan  1. Type 2 Diabetes Mellitus: Stable. A1c level has increased from 6.2 to 6.6, likely due to increased consumption of takeout food.  - Discontinue Jardiance.  - Increase Trulicity to 0.75 mg weekly.  - Monitor blood glucose levels.    2. Weight management: Stable. Current weight at 213 pounds. Comfortable weight range identified as 199 to 200 pounds. BMI is 27, but not entirely accurate due to " muscle mass.  - Counseling on maintaining weight within the comfortable range.    3. Skin bump: Small bump on the side mentioned, not painful. No mass detected on physical examination.  - Reassurance provided.    4. Bradycardia: Slow heart rate noted during recent colonoscopy, but no chest pain or pressure reported. Heart rate during visit was 85, previously recorded at 76. No concerns raised by the procedure team; heart rate likely regulated during the procedure.  - No further evaluation needed unless symptoms worsen.    Follow-up  - Labs to be done before the follow-up in October.  - Follow-up appointment scheduled for October to recheck the effectiveness of Trulicity and overall health status.    Return in about 3 months (around 10/8/2025), or if symptoms worsen or fail to improve, for DM.    I have placed the below orders and discussed them with an approved delegating provider. The MA is performing the below orders under the direction of Dr. Bustos.      Please note that this dictation was created using voice recognition software. I have made every reasonable attempt to correct obvious errors, but I expect that there are errors of grammar and possibly content that I did not discover before finalizing the note.

## 2025-07-09 ENCOUNTER — RESULTS FOLLOW-UP (OUTPATIENT)
Dept: MEDICAL GROUP | Facility: PHYSICIAN GROUP | Age: 50
End: 2025-07-09
Payer: COMMERCIAL

## 2025-07-09 LAB — RETINAL SCREEN: NEGATIVE

## 2025-08-06 DIAGNOSIS — E11.9 TYPE 2 DIABETES MELLITUS WITHOUT COMPLICATION, WITHOUT LONG-TERM CURRENT USE OF INSULIN (HCC): ICD-10-CM

## 2025-08-07 RX ORDER — EZETIMIBE 10 MG/1
10 TABLET ORAL DAILY
Qty: 90 TABLET | Refills: 3 | Status: SHIPPED | OUTPATIENT
Start: 2025-08-07